# Patient Record
Sex: FEMALE | Race: WHITE | Employment: UNEMPLOYED | URBAN - METROPOLITAN AREA
[De-identification: names, ages, dates, MRNs, and addresses within clinical notes are randomized per-mention and may not be internally consistent; named-entity substitution may affect disease eponyms.]

---

## 2019-06-09 ENCOUNTER — HOSPITAL ENCOUNTER (EMERGENCY)
Age: 54
Discharge: HOME | End: 2019-06-09
Attending: EMERGENCY MEDICINE
Payer: COMMERCIAL

## 2019-06-09 ENCOUNTER — APPOINTMENT (OUTPATIENT)
Dept: CT IMAGING | Age: 54
End: 2019-06-09
Payer: COMMERCIAL

## 2019-06-09 VITALS
OXYGEN SATURATION: 97 % | TEMPERATURE: 98.6 F | DIASTOLIC BLOOD PRESSURE: 74 MMHG | HEART RATE: 81 BPM | SYSTOLIC BLOOD PRESSURE: 114 MMHG | RESPIRATION RATE: 16 BRPM

## 2019-06-09 DIAGNOSIS — R51.9 NONINTRACTABLE HEADACHE, UNSPECIFIED CHRONICITY PATTERN, UNSPECIFIED HEADACHE TYPE: Primary | ICD-10-CM

## 2019-06-09 LAB
ALBUMIN SERPL-MCNC: 4 G/DL (ref 3.5–5.2)
ALP BLD-CCNC: 77 U/L (ref 35–104)
ALT SERPL-CCNC: 11 U/L (ref 0–32)
ANION GAP SERPL CALCULATED.3IONS-SCNC: 10 MMOL/L (ref 7–16)
AST SERPL-CCNC: 17 U/L (ref 0–31)
BASOPHILS ABSOLUTE: 0.06 E9/L (ref 0–0.2)
BASOPHILS RELATIVE PERCENT: 0.7 % (ref 0–2)
BILIRUB SERPL-MCNC: 0.2 MG/DL (ref 0–1.2)
BUN BLDV-MCNC: 16 MG/DL (ref 6–20)
CALCIUM SERPL-MCNC: 9.2 MG/DL (ref 8.6–10.2)
CHLORIDE BLD-SCNC: 105 MMOL/L (ref 98–107)
CO2: 28 MMOL/L (ref 22–29)
CREAT SERPL-MCNC: 0.9 MG/DL (ref 0.5–1)
EOSINOPHILS ABSOLUTE: 0.09 E9/L (ref 0.05–0.5)
EOSINOPHILS RELATIVE PERCENT: 1 % (ref 0–6)
GFR AFRICAN AMERICAN: >60
GFR NON-AFRICAN AMERICAN: >60 ML/MIN/1.73
GLUCOSE BLD-MCNC: 96 MG/DL (ref 74–99)
HCT VFR BLD CALC: 40.4 % (ref 34–48)
HEMOGLOBIN: 13.2 G/DL (ref 11.5–15.5)
IMMATURE GRANULOCYTES #: 0.06 E9/L
IMMATURE GRANULOCYTES %: 0.7 % (ref 0–5)
LACTIC ACID: 1.1 MMOL/L (ref 0.5–2.2)
LYMPHOCYTES ABSOLUTE: 2.45 E9/L (ref 1.5–4)
LYMPHOCYTES RELATIVE PERCENT: 27 % (ref 20–42)
MCH RBC QN AUTO: 28.6 PG (ref 26–35)
MCHC RBC AUTO-ENTMCNC: 32.7 % (ref 32–34.5)
MCV RBC AUTO: 87.6 FL (ref 80–99.9)
MONOCYTES ABSOLUTE: 0.73 E9/L (ref 0.1–0.95)
MONOCYTES RELATIVE PERCENT: 8 % (ref 2–12)
NEUTROPHILS ABSOLUTE: 5.7 E9/L (ref 1.8–7.3)
NEUTROPHILS RELATIVE PERCENT: 62.6 % (ref 43–80)
PDW BLD-RTO: 13.5 FL (ref 11.5–15)
PLATELET # BLD: 204 E9/L (ref 130–450)
PMV BLD AUTO: 12.3 FL (ref 7–12)
POTASSIUM REFLEX MAGNESIUM: 3.7 MMOL/L (ref 3.5–5)
RBC # BLD: 4.61 E12/L (ref 3.5–5.5)
SODIUM BLD-SCNC: 143 MMOL/L (ref 132–146)
TOTAL PROTEIN: 7 G/DL (ref 6.4–8.3)
WBC # BLD: 9.1 E9/L (ref 4.5–11.5)

## 2019-06-09 PROCEDURE — 96375 TX/PRO/DX INJ NEW DRUG ADDON: CPT

## 2019-06-09 PROCEDURE — 2580000003 HC RX 258: Performed by: NURSE PRACTITIONER

## 2019-06-09 PROCEDURE — 70498 CT ANGIOGRAPHY NECK: CPT

## 2019-06-09 PROCEDURE — 6360000002 HC RX W HCPCS: Performed by: NURSE PRACTITIONER

## 2019-06-09 PROCEDURE — 96374 THER/PROPH/DIAG INJ IV PUSH: CPT

## 2019-06-09 PROCEDURE — 70496 CT ANGIOGRAPHY HEAD: CPT

## 2019-06-09 PROCEDURE — 99284 EMERGENCY DEPT VISIT MOD MDM: CPT

## 2019-06-09 PROCEDURE — 85025 COMPLETE CBC W/AUTO DIFF WBC: CPT

## 2019-06-09 PROCEDURE — 83605 ASSAY OF LACTIC ACID: CPT

## 2019-06-09 PROCEDURE — 96376 TX/PRO/DX INJ SAME DRUG ADON: CPT

## 2019-06-09 PROCEDURE — 36415 COLL VENOUS BLD VENIPUNCTURE: CPT

## 2019-06-09 PROCEDURE — 80053 COMPREHEN METABOLIC PANEL: CPT

## 2019-06-09 PROCEDURE — 6360000002 HC RX W HCPCS

## 2019-06-09 PROCEDURE — 93005 ELECTROCARDIOGRAM TRACING: CPT | Performed by: EMERGENCY MEDICINE

## 2019-06-09 PROCEDURE — 6360000004 HC RX CONTRAST MEDICATION: Performed by: RADIOLOGY

## 2019-06-09 PROCEDURE — 70450 CT HEAD/BRAIN W/O DYE: CPT

## 2019-06-09 RX ORDER — 0.9 % SODIUM CHLORIDE 0.9 %
1000 INTRAVENOUS SOLUTION INTRAVENOUS ONCE
Status: COMPLETED | OUTPATIENT
Start: 2019-06-09 | End: 2019-06-09

## 2019-06-09 RX ORDER — METOCLOPRAMIDE HYDROCHLORIDE 5 MG/ML
10 INJECTION INTRAMUSCULAR; INTRAVENOUS ONCE
Status: COMPLETED | OUTPATIENT
Start: 2019-06-09 | End: 2019-06-09

## 2019-06-09 RX ORDER — DIPHENHYDRAMINE HYDROCHLORIDE 50 MG/ML
25 INJECTION INTRAMUSCULAR; INTRAVENOUS ONCE
Status: COMPLETED | OUTPATIENT
Start: 2019-06-09 | End: 2019-06-09

## 2019-06-09 RX ORDER — KETOROLAC TROMETHAMINE 30 MG/ML
15 INJECTION, SOLUTION INTRAMUSCULAR; INTRAVENOUS ONCE
Status: COMPLETED | OUTPATIENT
Start: 2019-06-09 | End: 2019-06-09

## 2019-06-09 RX ORDER — MORPHINE SULFATE 4 MG/ML
4 INJECTION, SOLUTION INTRAMUSCULAR; INTRAVENOUS ONCE
Status: COMPLETED | OUTPATIENT
Start: 2019-06-09 | End: 2019-06-09

## 2019-06-09 RX ORDER — KETOROLAC TROMETHAMINE 30 MG/ML
INJECTION, SOLUTION INTRAMUSCULAR; INTRAVENOUS
Status: COMPLETED
Start: 2019-06-09 | End: 2019-06-09

## 2019-06-09 RX ADMIN — MORPHINE SULFATE 4 MG: 4 INJECTION INTRAVENOUS at 18:51

## 2019-06-09 RX ADMIN — SODIUM CHLORIDE 1000 ML: 9 INJECTION, SOLUTION INTRAVENOUS at 17:52

## 2019-06-09 RX ADMIN — KETOROLAC TROMETHAMINE 15 MG: 30 INJECTION, SOLUTION INTRAMUSCULAR at 17:54

## 2019-06-09 RX ADMIN — KETOROLAC TROMETHAMINE 15 MG: 30 INJECTION, SOLUTION INTRAMUSCULAR; INTRAVENOUS at 21:15

## 2019-06-09 RX ADMIN — IOPAMIDOL 70 ML: 755 INJECTION, SOLUTION INTRAVENOUS at 20:24

## 2019-06-09 RX ADMIN — DIPHENHYDRAMINE HYDROCHLORIDE 25 MG: 50 INJECTION, SOLUTION INTRAMUSCULAR; INTRAVENOUS at 17:52

## 2019-06-09 RX ADMIN — METOCLOPRAMIDE 10 MG: 5 INJECTION, SOLUTION INTRAMUSCULAR; INTRAVENOUS at 17:53

## 2019-06-09 ASSESSMENT — PAIN DESCRIPTION - LOCATION: LOCATION: HEAD

## 2019-06-09 ASSESSMENT — PAIN SCALES - GENERAL
PAINLEVEL_OUTOF10: 10
PAINLEVEL_OUTOF10: 10
PAINLEVEL_OUTOF10: 8
PAINLEVEL_OUTOF10: 4

## 2019-06-09 ASSESSMENT — PAIN DESCRIPTION - FREQUENCY: FREQUENCY: CONTINUOUS

## 2019-06-09 ASSESSMENT — PAIN DESCRIPTION - DESCRIPTORS: DESCRIPTORS: OTHER (COMMENT)

## 2019-06-09 ASSESSMENT — PAIN DESCRIPTION - ORIENTATION: ORIENTATION: ANTERIOR

## 2019-06-09 ASSESSMENT — PAIN DESCRIPTION - PAIN TYPE: TYPE: ACUTE PAIN

## 2019-06-09 NOTE — ED PROVIDER NOTES
ED Attending  CC: Nicole         Department of Emergency Medicine   ED  Provider Note  Admit Date/RoomTime: 6/9/2019  5:17 PM  ED Room: /   Chief Complaint:   Loss of Consciousness (Witnesses state she has passed out multiple times today) and Headache (Migraine headache since thursday)    History of Present Illness   Source of history provided by:  patient. History/Exam Limitations: none. Tristan Bacon is a 48 y.o. old female who has a past medical hx of:   Past Medical History:   Diagnosis Date    Headache(784.0)     migraine    presents to the emergency department by ambulance for complaint of headache for the past 4 days. She states that she has a history of migraines and takes Maxalt. She states that this headache feels different than her normal migraine headaches and the Maxalt is not helping at all. Reports photosensitivity denies or vomiting. Putting to family today she passed out 4 times. Episode she was sitting up felt lightheaded and passed out without falling out of the chair. Each episode was witnessed and denies head injury. He that the pain is on the top of her head and feels like \"my head is going to explode. \" Denies chest pain, shortness of breath, neck pain, fever, chills, nausea, vomiting, diplopia, visual changes, numbness, tingling, or difficulty speaking. Since onset the symptoms have been no change and moderate to severe in severity. The patient has history of migraine headaches diagnosed in the past.  Today's episode is not typical for her. She has had PCP evaluation in the past. There has been no history of recent trauma. Treatment prior to arrival consisted of: Maxalt with minimal relief of symptoms. She has a history of no anticoagulation use. ROS    Pertinent positives and negatives are stated within HPI, all other systems reviewed and are negative.     Past Surgical History:   Procedure Laterality Date    APPENDECTOMY      HIP CLOSED REDUCTION  [id number]    RIGHT WITH involving proximal or distal cervical   internal carotid arteries or vertebral arteries. 4. Congenital hypoplasia of basilar artery. CT Head WO Contrast   Final Result   No indication for an acute intracranial process. ED Course / Medical Decision Making     Medications   0.9 % sodium chloride bolus (0 mLs Intravenous Stopped 6/9/19 2122)   diphenhydrAMINE (BENADRYL) injection 25 mg (25 mg Intravenous Given 6/9/19 1752)   metoclopramide (REGLAN) injection 10 mg (10 mg Intravenous Given 6/9/19 1753)   ketorolac (TORADOL) injection 15 mg (15 mg Intravenous Given 6/9/19 1754)   morphine sulfate (PF) injection 4 mg (4 mg Intravenous Given 6/9/19 1851)   iopamidol (ISOVUE-370) 76 % injection 70 mL (70 mLs Intravenous Given 6/9/19 2024)   ketorolac (TORADOL) injection 15 mg (15 mg Intravenous Given 6/9/19 2115)        Re-examination:  6/9/19       Time: 9068 pt alert and reports easing of headache \"5\"/10   1910 Dr Marcelino Rey at bedside    Consult(s):   None    Procedure(s):   none    MDM:   Geoffrey Templeton is a 61-year-old female who complains of headache for the past 4 days. Reports having a history of migraines and uses Maxalt with no relief of her headache. Today she reports having 3-4 syncopal episodes while sitting up in a chair. They were witnessed and per family she slumped over never fell out of the chair and never struck her head. CT of the head without contrast negative for any acute findings. CBC shows no leukocytosis stable H&H of 13.2/40.4, CMP within normal limits, it acid 1.1 CTA of head and neck no acute findings. IV hydration given medicated with Benadryl, Reglan, Toradol and morphine with significant improvement in her headache. Neurologically she remained intact, no meningeal signs, and non-toxic appearing. Results reviewed with patient and family and plan for discharge. Verbalized understanding and agrees with plan. Instructed to follow up with PCP.  Advised to return for any new,

## 2019-06-10 LAB
EKG ATRIAL RATE: 79 BPM
EKG P AXIS: 77 DEGREES
EKG P-R INTERVAL: 168 MS
EKG Q-T INTERVAL: 400 MS
EKG QRS DURATION: 96 MS
EKG QTC CALCULATION (BAZETT): 458 MS
EKG R AXIS: 57 DEGREES
EKG T AXIS: 75 DEGREES
EKG VENTRICULAR RATE: 79 BPM

## 2019-06-10 PROCEDURE — 93010 ELECTROCARDIOGRAM REPORT: CPT | Performed by: INTERNAL MEDICINE

## 2021-05-22 ENCOUNTER — APPOINTMENT (OUTPATIENT)
Dept: CT IMAGING | Age: 56
DRG: 045 | End: 2021-05-22
Payer: COMMERCIAL

## 2021-05-22 ENCOUNTER — HOSPITAL ENCOUNTER (INPATIENT)
Age: 56
LOS: 3 days | Discharge: HOME | DRG: 045 | End: 2021-05-25
Attending: EMERGENCY MEDICINE | Admitting: FAMILY MEDICINE
Payer: COMMERCIAL

## 2021-05-22 ENCOUNTER — APPOINTMENT (OUTPATIENT)
Dept: GENERAL RADIOLOGY | Age: 56
DRG: 045 | End: 2021-05-22
Payer: COMMERCIAL

## 2021-05-22 DIAGNOSIS — I63.9 CEREBROVASCULAR ACCIDENT (CVA), UNSPECIFIED MECHANISM (HCC): Primary | ICD-10-CM

## 2021-05-22 LAB
ALBUMIN SERPL-MCNC: 4.4 G/DL (ref 3.5–5.2)
ALP BLD-CCNC: 87 U/L (ref 35–104)
ALT SERPL-CCNC: 16 U/L (ref 0–32)
ANION GAP SERPL CALCULATED.3IONS-SCNC: 14 MMOL/L (ref 7–16)
APTT: 32.8 SEC (ref 24.5–35.1)
AST SERPL-CCNC: 20 U/L (ref 0–31)
BASOPHILS ABSOLUTE: 0.05 E9/L (ref 0–0.2)
BASOPHILS RELATIVE PERCENT: 0.6 % (ref 0–2)
BILIRUB SERPL-MCNC: 0.6 MG/DL (ref 0–1.2)
BUN BLDV-MCNC: 20 MG/DL (ref 6–20)
CALCIUM SERPL-MCNC: 9.6 MG/DL (ref 8.6–10.2)
CHLORIDE BLD-SCNC: 103 MMOL/L (ref 98–107)
CHP ED QC CHECK: YES
CO2: 23 MMOL/L (ref 22–29)
CREAT SERPL-MCNC: 0.9 MG/DL (ref 0.5–1)
EOSINOPHILS ABSOLUTE: 0.07 E9/L (ref 0.05–0.5)
EOSINOPHILS RELATIVE PERCENT: 0.8 % (ref 0–6)
GFR AFRICAN AMERICAN: >60
GFR NON-AFRICAN AMERICAN: >60 ML/MIN/1.73
GLUCOSE BLD-MCNC: 80 MG/DL (ref 74–99)
GLUCOSE BLD-MCNC: 81 MG/DL
HCT VFR BLD CALC: 40.9 % (ref 34–48)
HEMOGLOBIN: 13.2 G/DL (ref 11.5–15.5)
IMMATURE GRANULOCYTES #: 0.03 E9/L
IMMATURE GRANULOCYTES %: 0.3 % (ref 0–5)
INR BLD: 1
LYMPHOCYTES ABSOLUTE: 1.87 E9/L (ref 1.5–4)
LYMPHOCYTES RELATIVE PERCENT: 21.8 % (ref 20–42)
MCH RBC QN AUTO: 28.2 PG (ref 26–35)
MCHC RBC AUTO-ENTMCNC: 32.3 % (ref 32–34.5)
MCV RBC AUTO: 87.4 FL (ref 80–99.9)
METER GLUCOSE: 81 MG/DL (ref 74–99)
MONOCYTES ABSOLUTE: 0.62 E9/L (ref 0.1–0.95)
MONOCYTES RELATIVE PERCENT: 7.2 % (ref 2–12)
NEUTROPHILS ABSOLUTE: 5.95 E9/L (ref 1.8–7.3)
NEUTROPHILS RELATIVE PERCENT: 69.3 % (ref 43–80)
PDW BLD-RTO: 13.3 FL (ref 11.5–15)
PLATELET # BLD: 242 E9/L (ref 130–450)
PMV BLD AUTO: 11.8 FL (ref 7–12)
POTASSIUM SERPL-SCNC: 3.6 MMOL/L (ref 3.5–5)
PROTHROMBIN TIME: 11.2 SEC (ref 9.3–12.4)
RBC # BLD: 4.68 E12/L (ref 3.5–5.5)
SARS-COV-2, NAAT: NOT DETECTED
SODIUM BLD-SCNC: 140 MMOL/L (ref 132–146)
TOTAL PROTEIN: 7.6 G/DL (ref 6.4–8.3)
TROPONIN: <0.01 NG/ML (ref 0–0.03)
WBC # BLD: 8.6 E9/L (ref 4.5–11.5)

## 2021-05-22 PROCEDURE — 99284 EMERGENCY DEPT VISIT MOD MDM: CPT

## 2021-05-22 PROCEDURE — 6360000002 HC RX W HCPCS: Performed by: EMERGENCY MEDICINE

## 2021-05-22 PROCEDURE — 70498 CT ANGIOGRAPHY NECK: CPT

## 2021-05-22 PROCEDURE — 99449 NTRPROF PH1/NTRNET/EHR 31/>: CPT | Performed by: PSYCHIATRY & NEUROLOGY

## 2021-05-22 PROCEDURE — 70450 CT HEAD/BRAIN W/O DYE: CPT

## 2021-05-22 PROCEDURE — 71045 X-RAY EXAM CHEST 1 VIEW: CPT

## 2021-05-22 PROCEDURE — 85025 COMPLETE CBC W/AUTO DIFF WBC: CPT

## 2021-05-22 PROCEDURE — 6360000004 HC RX CONTRAST MEDICATION: Performed by: RADIOLOGY

## 2021-05-22 PROCEDURE — 70496 CT ANGIOGRAPHY HEAD: CPT

## 2021-05-22 PROCEDURE — 82962 GLUCOSE BLOOD TEST: CPT

## 2021-05-22 PROCEDURE — 6370000000 HC RX 637 (ALT 250 FOR IP)

## 2021-05-22 PROCEDURE — 2000000000 HC ICU R&B

## 2021-05-22 PROCEDURE — 36415 COLL VENOUS BLD VENIPUNCTURE: CPT

## 2021-05-22 PROCEDURE — 2580000003 HC RX 258

## 2021-05-22 PROCEDURE — 85610 PROTHROMBIN TIME: CPT

## 2021-05-22 PROCEDURE — 80053 COMPREHEN METABOLIC PANEL: CPT

## 2021-05-22 PROCEDURE — 96374 THER/PROPH/DIAG INJ IV PUSH: CPT

## 2021-05-22 PROCEDURE — 2580000003 HC RX 258: Performed by: EMERGENCY MEDICINE

## 2021-05-22 PROCEDURE — 85730 THROMBOPLASTIN TIME PARTIAL: CPT

## 2021-05-22 PROCEDURE — 6360000002 HC RX W HCPCS

## 2021-05-22 PROCEDURE — 0042T CT BRAIN PERFUSION: CPT | Performed by: RADIOLOGY

## 2021-05-22 PROCEDURE — 3E03317 INTRODUCTION OF OTHER THROMBOLYTIC INTO PERIPHERAL VEIN, PERCUTANEOUS APPROACH: ICD-10-PCS | Performed by: INTERNAL MEDICINE

## 2021-05-22 PROCEDURE — 99291 CRITICAL CARE FIRST HOUR: CPT | Performed by: SURGERY

## 2021-05-22 PROCEDURE — 84484 ASSAY OF TROPONIN QUANT: CPT

## 2021-05-22 PROCEDURE — 93005 ELECTROCARDIOGRAM TRACING: CPT | Performed by: EMERGENCY MEDICINE

## 2021-05-22 PROCEDURE — 0042T CT BRAIN PERFUSION: CPT

## 2021-05-22 PROCEDURE — 70498 CT ANGIOGRAPHY NECK: CPT | Performed by: RADIOLOGY

## 2021-05-22 PROCEDURE — 87635 SARS-COV-2 COVID-19 AMP PRB: CPT

## 2021-05-22 PROCEDURE — 70496 CT ANGIOGRAPHY HEAD: CPT | Performed by: RADIOLOGY

## 2021-05-22 RX ORDER — PROMETHAZINE HYDROCHLORIDE 25 MG/1
12.5 TABLET ORAL EVERY 6 HOURS PRN
Status: DISCONTINUED | OUTPATIENT
Start: 2021-05-22 | End: 2021-05-25 | Stop reason: HOSPADM

## 2021-05-22 RX ORDER — ACETAMINOPHEN 500 MG
TABLET ORAL
Status: COMPLETED
Start: 2021-05-22 | End: 2021-05-22

## 2021-05-22 RX ORDER — SODIUM CHLORIDE 0.9 % (FLUSH) 0.9 %
5-40 SYRINGE (ML) INJECTION PRN
Status: DISCONTINUED | OUTPATIENT
Start: 2021-05-22 | End: 2021-05-25 | Stop reason: HOSPADM

## 2021-05-22 RX ORDER — ROSUVASTATIN CALCIUM 20 MG/1
40 TABLET, COATED ORAL NIGHTLY
Status: DISCONTINUED | OUTPATIENT
Start: 2021-05-22 | End: 2021-05-25 | Stop reason: HOSPADM

## 2021-05-22 RX ORDER — SODIUM CHLORIDE 0.9 % (FLUSH) 0.9 %
10 SYRINGE (ML) INJECTION
Status: ACTIVE | OUTPATIENT
Start: 2021-05-22 | End: 2021-05-22

## 2021-05-22 RX ORDER — SODIUM CHLORIDE 0.9 % (FLUSH) 0.9 %
5-40 SYRINGE (ML) INJECTION EVERY 12 HOURS SCHEDULED
Status: DISCONTINUED | OUTPATIENT
Start: 2021-05-22 | End: 2021-05-25 | Stop reason: HOSPADM

## 2021-05-22 RX ORDER — DEXTROSE MONOHYDRATE 25 G/50ML
12.5 INJECTION, SOLUTION INTRAVENOUS
Status: ACTIVE | OUTPATIENT
Start: 2021-05-22 | End: 2021-05-22

## 2021-05-22 RX ORDER — ONDANSETRON 2 MG/ML
4 INJECTION INTRAMUSCULAR; INTRAVENOUS EVERY 6 HOURS PRN
Status: DISCONTINUED | OUTPATIENT
Start: 2021-05-22 | End: 2021-05-25 | Stop reason: HOSPADM

## 2021-05-22 RX ORDER — 0.9 % SODIUM CHLORIDE 0.9 %
50 INTRAVENOUS SOLUTION INTRAVENOUS ONCE
Status: COMPLETED | OUTPATIENT
Start: 2021-05-22 | End: 2021-05-22

## 2021-05-22 RX ORDER — SODIUM CHLORIDE 9 MG/ML
25 INJECTION, SOLUTION INTRAVENOUS PRN
Status: DISCONTINUED | OUTPATIENT
Start: 2021-05-22 | End: 2021-05-25 | Stop reason: HOSPADM

## 2021-05-22 RX ADMIN — ALTEPLASE 7.8 MG: KIT at 15:49

## 2021-05-22 RX ADMIN — SODIUM CHLORIDE 50 ML: 9 INJECTION, SOLUTION INTRAVENOUS at 16:45

## 2021-05-22 RX ADMIN — ACETAMINOPHEN 1000 MG: 500 TABLET ORAL at 18:45

## 2021-05-22 RX ADMIN — IOPAMIDOL 100 ML: 755 INJECTION, SOLUTION INTRAVENOUS at 15:35

## 2021-05-22 RX ADMIN — ALTEPLASE 69.8 MG: KIT at 15:50

## 2021-05-22 ASSESSMENT — PAIN SCALES - GENERAL
PAINLEVEL_OUTOF10: 0
PAINLEVEL_OUTOF10: 7
PAINLEVEL_OUTOF10: 0
PAINLEVEL_OUTOF10: 0

## 2021-05-22 NOTE — H&P
Hospitalist History & Physical      PCP: Sudha Zabala MD    Date of Admission: 5/22/2021    Date of Service: Pt seen/examined on 5/22/2021 and is admitted to Inpatient with expected LOS greater than two midnights due to medical therapy. Chief Complaint:  had concerns including Numbness (left arm numbness started while mowing the lawn. ). History Of Present Illness:    Ms. Giana Mcbride is a 50y.o. year old female  who  has a past medical history of Headache(784.0). She presented to the ER on 5/22/21 with complaints of left-sided weakness onset approximately 1-1/2 hours prior to arrival.  Patient states that she was working in the yard this afternoon when her left arm started tingling. She states that within 5 minutes her entire left arm was numb, her left leg was weak and she had burning on the left side of her neck. She states that she eventually felt so \"sick\" that she laid down on the floor. Her daughter called for an ambulance. Her initial NIH stroke scale score was 6. Vital signs were stable on arrival. Labs were unremarkable. Imaging of the head and neck showed mild atherosclerotic disease with no hemodynamically significant left or right internal carotid stenosis. CT brain perfusion showed no ischemic penumbra. Telestroke was consulted TPA was administered at . She is to be admitted to neuro intensive. Past Medical History:        Diagnosis Date    Headache(784.0)     migraine       Past Surgical History:        Procedure Laterality Date    APPENDECTOMY      HIP CLOSED REDUCTION  [id number]    RIGHT WITH PERCUTANEOUS PINNING    HYSTERECTOMY         Medications Prior to Admission:      Prior to Admission medications    Medication Sig Start Date End Date Taking? Authorizing Provider   ibuprofen (ADVIL;MOTRIN) 800 MG tablet Take 1 tablet by mouth every 8 hours as needed for Pain.  4/22/15  Yes Dereje Ebbs, APRN - CNP   Rizatriptan Benzoate (MAXALT PO) Take  by mouth as needed. Yes Historical Provider, MD       Allergies:  Patient has no known allergies. Social History:    RESIDENCE: Private residence  TOBACCO:   reports that she has been smoking. She has a 20.00 pack-year smoking history. She has never used smokeless tobacco.  ETOH:   reports no history of alcohol use. Family History:    As follows:      Problem Relation Age of Onset    High Blood Pressure Mother     Stroke Father     Bleeding Prob Father        REVIEW OF SYSTEMS:   Pertinent positives as noted in the HPI. All other systems reviewed and negative. PHYSICAL EXAM:  /81   Pulse 75   Temp 98 °F (36.7 °C) (Tympanic)   Resp 19   Ht 5' 8\" (1.727 m)   Wt 190 lb (86.2 kg)   SpO2 95%   BMI 28.89 kg/m²   General appearance: No apparent distress, appears stated age and cooperative. HEENT: Normal cephalic, atraumatic without obvious deformity. Pupils equal, round, and reactive to light. Neck: Supple, with full range of motion. No jugular venous distention. Trachea midline. Respiratory:  Clear to auscultation bilaterally. No apparent distress. Cardiovascular:  Regular rate and rhythm. S1, S2 without murmurs, rubs, or gallops. PV: Brisk capillary refill. +2 pedal and radial pulses bilaterally. No clubbing, cyanosis, edema of bilateral lower extremities. Abdomen: Soft, non-tender, non-distended. +BS  Musculoskeletal: No obvious deformities or erythematous or edematous joints. Skin: Normal skin color. No rashes or lesions. Neurologic: Left upper extremity weakness, left lower extremity with drift.   No facial droop, no dysarthria or aphasia noted  Psychiatric: Calm and cooperative    Reviewed EKG and CXR personally      CBC:   Recent Labs     05/22/21  1511   WBC 8.6   RBC 4.68   HGB 13.2   HCT 40.9   MCV 87.4   RDW 13.3        BMP:   Recent Labs     05/22/21  1511      K 3.6      CO2 23   BUN 20   CREATININE 0.9     LFT:  Recent Labs     05/22/21  1511   PROT 7.6 ALKPHOS 87   ALT 16   AST 20   BILITOT 0.6     CE:  Recent Labs     05/22/21  1511   TROPONINI <0.01     PT/INR:   Recent Labs     05/22/21  1505   INR 1.0   APTT 32.8     BNP: No results for input(s): BNP in the last 72 hours. ESR: No results found for: SEDRATE  CRP: No results found for: CRP  D Dimer: No results found for: DDIMER   Folate and B12: No results found for: ZXXPUTMP29, No results found for: FOLATE  Lactic Acid:   Lab Results   Component Value Date    LACTA 1.1 06/09/2019     Thyroid Studies: No results found for: TSH, M4JEKRU, S5GGAJS, THYROIDAB    Oupatient labs:  Lab Results   Component Value Date    INR 1.0 05/22/2021       Urinalysis:  No results found for: NITRU, WBCUA, BACTERIA, RBCUA, BLOODU, SPECGRAV, GLUCOSEU    Imaging:  CT HEAD WO CONTRAST    Result Date: 5/22/2021  No acute intracranial abnormality. Results were called by Dr. Ty Begum to [address] on 5/22/2021 at 15:43. CTA NECK W CONTRAST    Result Date: 5/22/2021  1. Mild atherosclerotic disease . No large vessel occlusion identified 2. Estimated stenosis of the proximal right and left internal carotid artery by NASCET criteria is not hemodynamically significant This study was analyzed by the [employer] algorithm. CT BRAIN PERFUSION    Result Date: 5/22/2021  No significant ischemic penumbra identified This study was analyzed by the [employer] algorithm. CTA HEAD W CONTRAST    Result Date: 5/22/2021  1. Mild atherosclerotic disease . No large vessel occlusion identified 2. Estimated stenosis of the proximal right and left internal carotid artery by NASCET criteria is not hemodynamically significant This study was analyzed by the [employer] ai algorithm. ASSESSMENT:    Acute ischemic CVA-initial NIH stroke scale score 6 on arrival-no movement of the left arm, left lower extremity drift and mild to moderate sensory loss on the left side. Imaging without any evidence of hemorrhage or ischemic penumbra. Given TPA at . Will require workup as the etiology is unclear. Former smoker- 20 pack year smoking hx, quit June 2020    Hx right hip fracture s/p percutaneous pinning (2013)    PLAN:    Per Neuro ICU team        Diet: Diet NPO Effective Now  Code Status: Prior  Surrogate decision maker confirmed with patient:   Extended Emergency Contact Information  Primary Emergency Contact: Hernan Diamond 82 Phone: [phone]  Relation: Child  Secondary Emergency Contact: Ameena Songr  Address: [address] [place] [address]           Liat Virgen [address] [place] [address] Phone: [phone]  Relation: Parent      Disposition: []Med/Surg [] Intermediate [x] ICU/CCU  Admit status: [] Observation [x] Inpatient     +++++++++++++++++++++++++++++++++++++++++++++++++  [address], [place]  +++++++++++++++++++++++++++++++++++++++++++++++++  NOTE: This report was transcribed using voice recognition software. Every effort was made to ensure accuracy; however, inadvertent computerized transcription errors may be present. Patient seen independently of NP, resting on ER stretcher. Reports improved movement of LLE and slight movement of left fingers. Also c/o headache. Reports she has a history of migraines. Many questions from patient and family answered, will continue workup, monitor telemetry.     [address]

## 2021-05-22 NOTE — ED NOTES
Bed: 20  Expected date:   Expected time:   Means of arrival:   Comments:  LF Darrick Opitz, RN  05/22/21 6843

## 2021-05-22 NOTE — ED PROVIDER NOTES
HPI:  5/22/21,   Time: 3:09 PM EDT         Isrrael He is a 50 y.o. female presenting to the ED for sudden onset of numbness and weakness of her left arm, beginning 90 to 120 minutes ago . The complaint has been persistent, moderate in severity, and worsened by nothing. ROS:   Pertinent positives and negatives are stated within HPI, all other systems reviewed and are negative.  --------------------------------------------- PAST HISTORY ---------------------------------------------  Past Medical History:  has a past medical history of Headache(784.0). Past Surgical History:  has a past surgical history that includes Hysterectomy; Appendectomy; and Hip Closed Reduction ([id number]). Social History:  reports that she has been smoking. She has a 20.00 pack-year smoking history. She has never used smokeless tobacco. She reports that she does not drink alcohol and does not use drugs. Family History: family history includes Bleeding Prob in her father; High Blood Pressure in her mother; Stroke in her father. The patients home medications have been reviewed. Allergies: Patient has no known allergies.     -------------------------------------------------- RESULTS -------------------------------------------------  All laboratory and radiology results have been personally reviewed by myself   LABS:  Results for orders placed or performed during the hospital encounter of 05/22/21   CBC Auto Differential   Result Value Ref Range    WBC 8.6 4.5 - 11.5 E9/L    RBC 4.68 3.50 - 5.50 E12/L    Hemoglobin 13.2 11.5 - 15.5 g/dL    Hematocrit 40.9 34.0 - 48.0 %    MCV 87.4 80.0 - 99.9 fL    MCH 28.2 26.0 - 35.0 pg    MCHC 32.3 32.0 - 34.5 %    RDW 13.3 11.5 - 15.0 fL    Platelets 737 077 - 718 E9/L    MPV 11.8 7.0 - 12.0 fL    Neutrophils % 69.3 43.0 - 80.0 %    Immature Granulocytes % 0.3 0.0 - 5.0 %    Lymphocytes % 21.8 20.0 - 42.0 %    Monocytes % 7.2 2.0 - 12.0 %    Eosinophils % 0.8 0.0 - 6.0 %    Basophils % 0.6 0.0 - 2.0 %    Neutrophils Absolute 5.95 1.80 - 7.30 E9/L    Immature Granulocytes # 0.03 E9/L    Lymphocytes Absolute 1.87 1.50 - 4.00 E9/L    Monocytes Absolute 0.62 0.10 - 0.95 E9/L    Eosinophils Absolute 0.07 0.05 - 0.50 E9/L    Basophils Absolute 0.05 0.00 - 0.20 E9/L   Comprehensive Metabolic Panel   Result Value Ref Range    Sodium 140 132 - 146 mmol/L    Potassium 3.6 3.5 - 5.0 mmol/L    Chloride 103 98 - 107 mmol/L    CO2 23 22 - 29 mmol/L    Anion Gap 14 7 - 16 mmol/L    Glucose 80 74 - 99 mg/dL    BUN 20 6 - 20 mg/dL    CREATININE 0.9 0.5 - 1.0 mg/dL    GFR Non-African American >60 >=60 mL/min/1.73    GFR African American >60     Calcium 9.6 8.6 - 10.2 mg/dL    Total Protein 7.6 6.4 - 8.3 g/dL    Albumin 4.4 3.5 - 5.2 g/dL    Total Bilirubin 0.6 0.0 - 1.2 mg/dL    Alkaline Phosphatase 87 35 - 104 U/L    ALT 16 0 - 32 U/L    AST 20 0 - 31 U/L   Troponin   Result Value Ref Range    Troponin <0.01 0.00 - 0.03 ng/mL   Protime-INR   Result Value Ref Range    Protime 11.2 9.3 - 12.4 sec    INR 1.0    APTT   Result Value Ref Range    aPTT 32.8 24.5 - 35.1 sec   POCT Glucose   Result Value Ref Range    Meter Glucose 81 74 - 99 mg/dL   POCT Glucose   Result Value Ref Range    Glucose 81 mg/dL    QC OK? yes    EKG 12 Lead   Result Value Ref Range    Ventricular Rate 83 BPM    Atrial Rate 83 BPM    P-R Interval 176 ms    QRS Duration 96 ms    Q-T Interval 392 ms    QTc Calculation (Bazett) 460 ms    P Axis 57 degrees    R Axis 6 degrees    T Axis 41 degrees       RADIOLOGY:  Interpreted by Radiologist.  CTA HEAD W CONTRAST   Final Result   1. Mild atherosclerotic disease . No large vessel occlusion   identified   2. Estimated stenosis of the proximal right and left internal carotid   artery by NASCET criteria is not hemodynamically significant         This study was analyzed by the [employer] algorithm. CTA NECK W CONTRAST   Final Result   1. Mild atherosclerotic disease .  No large vessel occlusion identified   2. Estimated stenosis of the proximal right and left internal carotid   artery by NASCET criteria is not hemodynamically significant         This study was analyzed by the [employer] algorithm. CT BRAIN PERFUSION   Final Result      No significant ischemic penumbra identified      This study was analyzed by the [employer] algorithm. CT HEAD WO CONTRAST   Final Result   No acute intracranial abnormality. Results were called by Dr. Bird Shane to Jacqueline Montero on 5/22/2021 at   15:43. XR CHEST PORTABLE    (Results Pending)   CT head without contrast    (Results Pending)       ------------------------- NURSING NOTES AND VITALS REVIEWED ---------------------------   The nursing notes within the ED encounter and vital signs as below have been reviewed. /83   Pulse 79   Temp 98 °F (36.7 °C) (Tympanic)   Resp 20   Ht 5' 8\" (1.727 m)   Wt 190 lb (86.2 kg)   SpO2 95%   BMI 28.89 kg/m²   Oxygen Saturation Interpretation: Normal      ---------------------------------------------------PHYSICAL EXAM--------------------------------------      Constitutional/General: Alert and oriented x3, well appearing, non toxic in NAD  Head: NC/AT  Eyes: PERRL, EOMI  Mouth: Oropharynx clear, handling secretions, no trismus  Neck: Supple, full ROM, no meningeal signs  Pulmonary: Lungs clear to auscultation bilaterally, no wheezes, rales, or rhonchi. Not in respiratory distress  Cardiovascular:  Regular rate and rhythm, no murmurs, gallops, or rubs. 2+ distal pulses  Abdomen: Soft, non tender, non distended,   Extremities: Moves all extremities x 4. Warm and well perfused  Skin: warm and dry without rash  Neurologic: GCS 15, see below  Psych: Normal Affect  NIH Stroke Scale at time of initial evaluation:  1A: Level of Consciousness 0 - alert; keenly responsive   1B: Ask Month and Age 0 - answers both questions correctly   1C:  Tell Patient To Open and Close Eyes, then Hand  Squeeze 0 - performs both tasks correctly   2: Test Horizontal Extraocular Movements 0 - normal   3: Test Visual Fields 0 - no visual loss   4: Test Facial Palsy 0 - normal symmetric movement   5A: Test Left Arm Motor Drift 4 - no movement   5B: Test Right Arm Motor Drift 0 - no drift, limb holds 90 (or 45) degrees for full 10 seconds   6A: Test Left Leg Motor Drift 1 - drift; leg falls by the end of the 5 second period but does not hit bed   6B: Test Right Leg Motor Drift 0 - no drift; leg holds 30 degree position for full 5 seconds   7: Test Limb Ataxia   (FNF/Heel-Shin) 0 - absent   8: Test Sensation 1 - mild to moderate sensory loss; patient feels pinprick is less sharp or is dull on the affected side; there is a loss of superficial pain with pinprick but patient is aware of being touched    9: Test Language/Aphasia 0 - no aphasia, normal   10: Test Dysarthria 0 - normal   11: Test Extinction/Inattention 0 - no abnormality   Total 6       Acute CVA Core Measures:      - t-PA Eligibility: IV t-PA was Administered-Given 10% over one minute, then remaining 90% given as infusion over 60 minutes.    - The case has been discussed with Dr. Izell Hamman, they agree this patient is eligible for t-PA.         ------------------------------ ED COURSE/MEDICAL DECISION MAKING----------------------  Medications   sodium chloride flush 0.9 % injection 10 mL (has no administration in time range)   sodium chloride flush 0.9 % injection 5-40 mL (has no administration in time range)   sodium chloride flush 0.9 % injection 5-40 mL (has no administration in time range)   0.9 % sodium chloride infusion (has no administration in time range)   enoxaparin (LOVENOX) injection 40 mg (has no administration in time range)   dextrose 50 % IV solution (has no administration in time range)   alteplase (ACTIVASE) injection 7.8 mg (0 mg Intravenous Stopped 5/22/21 1550)     Followed by   alteplase (ACTIVASE) injection 69.8 mg (69.8 mg Intravenous New Bag 5/22/21 6056)     Followed by   0.9 % sodium chloride bolus (has no administration in time range)   promethazine (PHENERGAN) tablet 12.5 mg (has no administration in time range)     Or   ondansetron (ZOFRAN) injection 4 mg (has no administration in time range)   rosuvastatin (CRESTOR) tablet 40 mg (has no administration in time range)   iopamidol (ISOVUE-370) 76 % injection 100 mL (100 mLs Intravenous Given 5/22/21 6342)     Please note that the withdrawal or failure to initiate urgent interventions for this patient would likely result in a life threatening deterioration or permanent disability. Accordingly this patient received 30 minutes of critical care time, excluding separately billable procedures. Medical Decision Making:    Patient was made a stroke team.  She was discussed with the neurologist who recommended that she be given TPA. She was consented after it was explained to her there was a 6% risk of intracranial hemorrhage with a 3% risk of a likely significant intracranial hemorrhage from the medicine itself. It was also explained to the patient that the benefit of the medicine is a potential 30% increase in function of her arm at 90 days. Patient understood this she discussed it with her family and elected to proceed with thrombolytic therapy. Thrombolytic therapy was administered. Patient was discussed with Dr. Fina Vargas and will be admitted to the ICU she was discussed with [facility] physicians and they agreed to be the admitting service    Counseling: The emergency provider has spoken with the patient and family member patient and daughter and discussed todays results, in addition to providing specific details for the plan of care and counseling regarding the diagnosis and prognosis. Questions are answered at this time and they are agreeable with the plan.      --------------------------------- IMPRESSION AND DISPOSITION ---------------------------------    IMPRESSION  1.  Cerebrovascular accident (CVA), unspecified mechanism ([facility] [address]        DISPOSITION  Disposition: Admit to CCU/ICU  Patient condition is critical                  Russ Denny MD  05/22/21 2038

## 2021-05-23 ENCOUNTER — APPOINTMENT (OUTPATIENT)
Dept: CT IMAGING | Age: 56
DRG: 045 | End: 2021-05-23
Payer: COMMERCIAL

## 2021-05-23 LAB
ANION GAP SERPL CALCULATED.3IONS-SCNC: 11 MMOL/L (ref 7–16)
ANION GAP SERPL CALCULATED.3IONS-SCNC: 12 MMOL/L (ref 7–16)
BUN BLDV-MCNC: 16 MG/DL (ref 6–20)
BUN BLDV-MCNC: 17 MG/DL (ref 6–20)
CALCIUM SERPL-MCNC: 8.9 MG/DL (ref 8.6–10.2)
CALCIUM SERPL-MCNC: 9.2 MG/DL (ref 8.6–10.2)
CHLORIDE BLD-SCNC: 102 MMOL/L (ref 98–107)
CHLORIDE BLD-SCNC: 105 MMOL/L (ref 98–107)
CO2: 23 MMOL/L (ref 22–29)
CO2: 24 MMOL/L (ref 22–29)
CREAT SERPL-MCNC: 0.7 MG/DL (ref 0.5–1)
CREAT SERPL-MCNC: 0.8 MG/DL (ref 0.5–1)
EKG ATRIAL RATE: 83 BPM
EKG P AXIS: 57 DEGREES
EKG P-R INTERVAL: 176 MS
EKG Q-T INTERVAL: 392 MS
EKG QRS DURATION: 96 MS
EKG QTC CALCULATION (BAZETT): 460 MS
EKG R AXIS: 6 DEGREES
EKG T AXIS: 41 DEGREES
EKG VENTRICULAR RATE: 83 BPM
GFR AFRICAN AMERICAN: >60
GFR AFRICAN AMERICAN: >60
GFR NON-AFRICAN AMERICAN: >60 ML/MIN/1.73
GFR NON-AFRICAN AMERICAN: >60 ML/MIN/1.73
GLUCOSE BLD-MCNC: 93 MG/DL (ref 74–99)
GLUCOSE BLD-MCNC: 99 MG/DL (ref 74–99)
HCT VFR BLD CALC: 38.5 % (ref 34–48)
HEMOGLOBIN: 12.5 G/DL (ref 11.5–15.5)
MAGNESIUM: 2.2 MG/DL (ref 1.6–2.6)
MCH RBC QN AUTO: 28.9 PG (ref 26–35)
MCHC RBC AUTO-ENTMCNC: 32.5 % (ref 32–34.5)
MCV RBC AUTO: 89.1 FL (ref 80–99.9)
PDW BLD-RTO: 13.4 FL (ref 11.5–15)
PHOSPHORUS: 4.3 MG/DL (ref 2.5–4.5)
PLATELET # BLD: 216 E9/L (ref 130–450)
PMV BLD AUTO: 12.2 FL (ref 7–12)
POTASSIUM SERPL-SCNC: 3.8 MMOL/L (ref 3.5–5)
POTASSIUM SERPL-SCNC: 4 MMOL/L (ref 3.5–5)
RBC # BLD: 4.32 E12/L (ref 3.5–5.5)
SODIUM BLD-SCNC: 137 MMOL/L (ref 132–146)
SODIUM BLD-SCNC: 140 MMOL/L (ref 132–146)
WBC # BLD: 5.5 E9/L (ref 4.5–11.5)

## 2021-05-23 PROCEDURE — 6370000000 HC RX 637 (ALT 250 FOR IP): Performed by: SURGERY

## 2021-05-23 PROCEDURE — 6370000000 HC RX 637 (ALT 250 FOR IP): Performed by: EMERGENCY MEDICINE

## 2021-05-23 PROCEDURE — 83735 ASSAY OF MAGNESIUM: CPT

## 2021-05-23 PROCEDURE — 2140000000 HC CCU INTERMEDIATE R&B

## 2021-05-23 PROCEDURE — 93010 ELECTROCARDIOGRAM REPORT: CPT | Performed by: INTERNAL MEDICINE

## 2021-05-23 PROCEDURE — 97161 PT EVAL LOW COMPLEX 20 MIN: CPT

## 2021-05-23 PROCEDURE — 84100 ASSAY OF PHOSPHORUS: CPT

## 2021-05-23 PROCEDURE — 36415 COLL VENOUS BLD VENIPUNCTURE: CPT

## 2021-05-23 PROCEDURE — 85027 COMPLETE CBC AUTOMATED: CPT

## 2021-05-23 PROCEDURE — 99253 IP/OBS CNSLTJ NEW/EST LOW 45: CPT | Performed by: PSYCHIATRY & NEUROLOGY

## 2021-05-23 PROCEDURE — 80048 BASIC METABOLIC PNL TOTAL CA: CPT

## 2021-05-23 PROCEDURE — 97530 THERAPEUTIC ACTIVITIES: CPT

## 2021-05-23 PROCEDURE — 70450 CT HEAD/BRAIN W/O DYE: CPT

## 2021-05-23 PROCEDURE — 2580000003 HC RX 258: Performed by: EMERGENCY MEDICINE

## 2021-05-23 RX ORDER — ACETAMINOPHEN 325 MG/1
650 TABLET ORAL EVERY 4 HOURS PRN
Status: DISCONTINUED | OUTPATIENT
Start: 2021-05-23 | End: 2021-05-25 | Stop reason: HOSPADM

## 2021-05-23 RX ORDER — OXYCODONE HYDROCHLORIDE 5 MG/1
5 TABLET ORAL EVERY 4 HOURS PRN
Status: DISCONTINUED | OUTPATIENT
Start: 2021-05-23 | End: 2021-05-25 | Stop reason: HOSPADM

## 2021-05-23 RX ADMIN — OXYCODONE 5 MG: 5 TABLET ORAL at 12:35

## 2021-05-23 RX ADMIN — ROSUVASTATIN 40 MG: 20 TABLET, FILM COATED ORAL at 01:24

## 2021-05-23 RX ADMIN — ROSUVASTATIN 40 MG: 20 TABLET, FILM COATED ORAL at 23:00

## 2021-05-23 RX ADMIN — ACETAMINOPHEN 650 MG: 325 TABLET ORAL at 01:30

## 2021-05-23 RX ADMIN — Medication 10 ML: at 00:00

## 2021-05-23 RX ADMIN — Medication 10 ML: at 09:13

## 2021-05-23 RX ADMIN — OXYCODONE 5 MG: 5 TABLET ORAL at 06:14

## 2021-05-23 RX ADMIN — Medication 5 ML: at 21:00

## 2021-05-23 ASSESSMENT — PAIN SCALES - GENERAL
PAINLEVEL_OUTOF10: 0
PAINLEVEL_OUTOF10: 8
PAINLEVEL_OUTOF10: 0

## 2021-05-23 ASSESSMENT — PAIN DESCRIPTION - DESCRIPTORS: DESCRIPTORS: HEADACHE

## 2021-05-23 ASSESSMENT — VISUAL ACUITY: VA_NORMAL: 1

## 2021-05-23 NOTE — CONSULTS
and atraumatic. Mouth/Throat:      Mouth: Mucous membranes are moist.   Eyes:      General: Lids are normal.      Extraocular Movements: Extraocular movements intact. Conjunctiva/sclera: Conjunctivae normal.      Pupils: Pupils are equal, round, and reactive to light. Cardiovascular:      Rate and Rhythm: Regular rhythm. Abdominal:      Palpations: Abdomen is soft. Musculoskeletal:      Cervical back: Neck supple. Skin:     General: Skin is warm and dry. Neurological:      Mental Status: She is alert. Deep Tendon Reflexes: Reflexes are normal and symmetric. Psychiatric:         Mood and Affect: Mood normal.         Speech: Speech normal.         Behavior: Behavior normal.                 Neurological Exam  Mental Status  Alert. Oriented to person, place, time and situation. Speech is normal. Language is fluent with no aphasia. Attention and concentration are normal.    Cranial Nerves  CN II: Visual acuity is normal. Visual fields full to confrontation. CN III, IV, VI: Extraocular movements intact bilaterally. Normal lids and orbits bilaterally. Pupils equal round and reactive to light bilaterally. CN V:  Left: Diminished sensation of the entire left side of the face. CN VII: Full and symmetric facial movement. CN VIII: Hearing is normal.  CN IX, X: Palate elevates symmetrically. Normal gag reflex. CN XI: Shoulder shrug strength is normal.  CN XII: Tongue midline without atrophy or fasciculations. Motor  Normal muscle bulk throughout. Normal muscle tone. No abnormal involuntary movements. Strength is 5/5 in all four extremities except as noted. Downward  drift without pronation of the left arm. Initially she gave no movement and no effort. However later in the exam with distraction she was able to hold her arm antigravity. Marked fluctuations in effort give way weakness. .    Sensory  Left hemisensory loss.      Reflexes  Deep tendon reflexes are 2+ and symmetric in all four

## 2021-05-23 NOTE — CONSULTS
Surgical Intensive Care Unit  Consult  Note  Date of admission:  5/22/2021  Reason for ICU transfer:  Critical care monitoring    HPI:   Patient is a 50year old female with PMH of migraines who presented to the ED via EMS with complaint of sudden onset of LUE weakness and numbness. Patient stating she was gardening this morning from about 0700hrs, and at 1300hrs felt onset of symptoms. Patient presented to the ED within 3 hours of symptoms onset. She denies trauma. Is not on AC. She endorses a migraine 2 days ago treated with Maxalt. In the ED, patient was found to have NIHSS = 6 with visual field defects, LUE weakness and numbness and LLE weakness and numbness. Stroke team consult following imaging with decision for tPA, given at 1500hrs. Denies any CP, SOB, N/V, fever, chills, urinary symptoms, GI symptoms. Patient stopped smoking 1 year ago, 40 pack year history of smoking. Physical Exam:  /74   Pulse 75   Temp 98 °F (36.7 °C) (Tympanic)   Resp 20   Ht 5' 8\" (1.727 m)   Wt 190 lb (86.2 kg)   SpO2 99%   BMI 28.89 kg/m²   Physical Exam  Constitutional:       General: She is awake. Appearance: Normal appearance. She is not ill-appearing or toxic-appearing. HENT:      Head: Normocephalic and atraumatic. Mouth/Throat:      Mouth: Mucous membranes are moist.      Pharynx: Oropharynx is clear. Eyes:      General: Visual field deficit present. Extraocular Movements: Extraocular movements intact. Pupils: Pupils are equal, round, and reactive to light. Visual Fields:      Left eye: DL in the upper temporal quadrant. DL in the lower temporal quadrant. Cardiovascular:      Rate and Rhythm: Normal rate and regular rhythm. Pulses: Normal pulses. Radial pulses are 2+ on the right side and 2+ on the left side. Dorsalis pedis pulses are 2+ on the right side and 2+ on the left side. Heart sounds: Normal heart sounds.    Pulmonary:      Effort: Pulmonary effort is normal.      Breath sounds: Normal breath sounds. Abdominal:      General: Abdomen is flat. There is no distension. Musculoskeletal:      Cervical back: Normal range of motion and neck supple. Right lower leg: No edema. Left lower leg: No edema. Skin:     General: Skin is warm and dry. Capillary Refill: Capillary refill takes less than 2 seconds. Neurological:      Mental Status: She is alert and oriented to person, place, and time. GCS: GCS eye subscore is 4. GCS verbal subscore is 5. GCS motor subscore is 6. Cranial Nerves: Cranial nerve deficit present. Sensory: Sensory deficit present. Motor: Weakness present. Comments: Strength RUE and RLE 5/5 without deficits. Strength LUE 1/5 with slight movement of fingers. 0/5 to LUE forearm  Decreased sensation to LUE and LLE. Psychiatric:         Mood and Affect: Mood normal.         Speech: Speech normal.         Behavior: Behavior is cooperative. NIH Stroke Scale/Score at time of initial evaluation:  1A: Level of Consciousness 0 - alert; keenly responsive   1B: Ask Month and Age 0 - answers both questions correctly   1C:  Tell Patient To Open and Close Eyes, then Hand  Squeeze 1 - performs one task correctly   2: Test Horizontal Extraocular Movements 0 - normal   3: Test Visual Fields 1 - partial hemianopia   4: Test Facial Palsy 0 - normal symmetric movement   5A: Test Left Arm Motor Drift 3 - no effort against gravity, limb falls   5B: Test Right Arm Motor Drift 0 - no drift, limb holds 90 (or 45) degrees for full 10 seconds   6A: Test Left Leg Motor Drift 0 - no drift; leg holds 30 degree position for full 5 seconds   6B: Test Right Leg Motor Drift 0 - no drift; leg holds 30 degree position for full 5 seconds   7: Test Limb Ataxia   (FNF/Heel-Shin) 0 - absent   8: Test Sensation 1 - mild to moderate sensory loss; patient feels pinprick is less sharp or is dull on the affected side; there is a loss of superficial pain with pinprick but patient is aware of being touched    9: Test Language/Aphasia 0 - no aphasia, normal   10: Test Dysarthria 0 - normal   11: Test Extinction/Inattention 0 - no abnormality   Total 6     ASSESSMENT / PLAN:  · Neuro:  NIHSS 6 in the NICU. Monitor neuro status. CT head in 24 hours post tPA. Neurology consult. · CV: Start statin. Monitor hemodynamics. SBP goal < 140. · Pulm: No issues. Monitor RR, SpO2   · GI:  No issues. Passed bedside swallow in ED. SLP eval. Diet. Antiemetics. · Renal: No issues. Monitor UOP  · Heme:       No issues. No AC for 24 hours post tPA  · ID:  No issues  · Endo: Monitor glucose. · MSK: No issues. Bowel regimen: Monitor, add regimen as needed. DVT proph:  SCDs, Start lovenox post repeat head CT if no bleed. GI proph:    Glucose protocol:   Mouth/eye care: Peridex/Aqua tears   Sanches:    keep in place for critical care monitoring of fluid balance. CVC sites:  None  Ancillary consults: IM, Neurology, OT/PT  Family Update: As available     Hospital course:  5/22/21  - Admitted. tPA     Tiffanie Blanco MD, PGY 2    Discussed with Dr. Modesta Ford.

## 2021-05-23 NOTE — PROGRESS NOTES
Physical Therapy  Physical Therapy Initial Assessment     Name: Claudia Hernandez  : 1965  MRN: [id number]      Date of Service: 2021    Evaluating PT:  Papo De La Cruz PT, DPT [id number]    Room #:  4498/1153-T  Diagnosis:  Acute cerebrovascular accident (CVA) [facility]) [I63.9]  PMHx/PSHx:  NA  Procedure/Surgery:   tPA  Precautions:  Falls, LUE weakness  Equipment Needs:  None    SUBJECTIVE:    Pt lives with daughter in a 1 story home with 2 stairs to enter and no rail. Full flight of steps and 1 rail to basement. Pt ambulated without device and was independent PTA. OBJECTIVE:   Initial Evaluation  Date: 21 Treatment Short Term/ Long Term   Goals   AM-PAC 6 Clicks      Was pt agreeable to Eval/treatment? Yes     Does pt have pain?  No c/o pain     Bed Mobility  Rolling: NT  Supine to sit: SBA with HOB elevated  Sit to supine: NT  Scooting: SBA  Mod Independent   Transfers Sit to stand: SBA  Stand to sit: SBA  Stand pivot: SBA no device  Independent   Ambulation   300 feet with SBA no device  >400 feet Independently   Stair negotiation: ascended and descended NT  >10 steps with 1 rail Mod Independent   ROM BUE:  WFL  BLE:  WFL     Strength BUE:  LUE weakness  BLE:  4+/5  Increase by 1/3 MMT grade   Balance Sitting EOB:  SBA  Dynamic Standing:  SBA no device  Sitting EOB:  Independent  Dynamic Standing:  Independent     Pt is A & O x 4  CAM-ICU: NT  RASS: 0  Sensation:  LUE paresthesias   Edema:  Unremarkable    Vitals:  Heart Rate at rest 72 bpm Heart Rate post session 76 bpm   SpO2 at rest 97% SpO2 post session 96%   Blood Pressure at rest 94/68 mmHg Blood Pressure post session 118/77 mmHg     Functional Status Score-Intensive Care Unit (FSS-ICU)   Rolling -/7   Supine to sit transfer 5/7   Unsupported sitting  5/7   Sit to stand transfers 5/7   Ambulation /7   Total  20/35       Therapeutic Exercises:  NA    Patient education  Pt educated on safety    Patient response to education:   Pt verbalized understanding Pt demonstrated skill Pt requires further education in this area   x x x     ASSESSMENT:    Conditions Requiring Skilled Therapeutic Intervention:    [x]Decreased strength     [x]Decreased ROM  [x]Decreased functional mobility  [x]Decreased balance   [x]Decreased endurance   []Decreased posture  []Decreased sensation  [x]Decreased coordination   []Decreased vision  []Decreased safety awareness   []Increased pain       Comments:  NP reported pt was stable for session. Pt was in bed upon arrival, agreeable to initial evaluation. Minimal use noted of LUE as pt reported continued weakness but improving. Upon sitting EOB, pt requested to use bathroom. Assisted pt to toilet and pt completed hygiene care without physical assistance. Once finished, pt ambulated into hallway with slightly decreased gait speed but no LOB noted. Pt was left in chair with all needs met and call light in reach. All lines remained intact. Family present in room. Reviewed LUE exercises to complete. Treatment:  Patient practiced and was instructed in the following treatment:     Bed mobility training - pt given verbal cues to facilitate proper sequencing and safety during supine>sit to complete task    Sitting EOB for >3 minutes for upright tolerance, postural awareness and BLE ROM   Transfer training - pt was given verbal cues to facilitate proper hand placement, technique and safety during sit to stand and stand to sit to complete task.  Gait training- pt was given verbal cues to facilitate safety during ambulation to complete task. Pt's/ family goals   1. Return home    Prognosis is good for reaching above PT goals. Patient and or family understand(s) diagnosis, prognosis, and plan of care.   Yes    PHYSICAL THERAPY PLAN OF CARE:    PT POC is established based on physician order and patient diagnosis     Referring provider/PT Order:  Rhett Germain MD / PT eval and treat  Diagnosis:  Acute cerebrovascular accident (CVA) ([facility] [address] [I63.9]  Specific instructions for next treatment:  LUE strengthening/exercises    Current Treatment Recommendations:     [x] Strengthening to improve independence with functional mobility   [x] ROM to improve independence with functional mobility   [x] Balance Training to improve static/dynamic balance and to reduce fall risk  [x] Endurance Training to improve activity tolerance during functional mobility   [x] Transfer Training to improve safety and independence with all functional transfers   [x] Gait Training to improve gait mechanics, endurance and asses need for appropriate assistive device  [x] Stair Training in preparation for safe discharge home and/or into the community   [] Positioning to prevent skin breakdown and contractures  [x] Safety and Education Training   [x] Patient/Caregiver Education   [] HEP  [] Other     PT long term treatment goals are located in above grid    Frequency of treatments: 2-5x/week x 1-2 weeks. Time in  1130  Time out  1150    Total Treatment Time  10 minutes     Evaluation Time includes thorough review of current medical information, gathering information on past medical history/social history and prior level of function, completion of standardized testing/informal observation of tasks, assessment of data and education on plan of care and goals.     CPT codes:  [x] Low Complexity PT evaluation 32343  [] Moderate Complexity PT evaluation 36908  [] High Complexity PT evaluation 64728  [] PT Re-evaluation 28203  [] Gait training 84195 - minutes  [] Manual therapy 22616 - minutes  [x] Therapeutic activities 50776 10 minutes  [] Therapeutic exercises 03639 - minutes  [] Neuromuscular reeducation 95426 - minutes     Tate Rausch, PT, DPT  [id number]

## 2021-05-23 NOTE — PROGRESS NOTES
· Endocrine: No acute issues. Monitor BS. · MSK: No acute issues. PT/OT  · Heme: tPA induced coagulopathy. TPA protocol      Pain control/Sedation: oxycodone, Tylenol  DVT prophylaxis: SCDs.    No Lovenox/heparin until  24 hours post tPA  Mouth/Eye care: as needed    Family update: updated over phone   Code status:  Full  Disposition:  ICU    Electronically signed by MERCY Thompson CNP on 5/23/2021 at 8:27 AM

## 2021-05-23 NOTE — PROGRESS NOTES
Hospitalist Progress Note      SYNOPSIS:     Ms. Cathleen Bo is a 50y.o. year old female  who  has a past medical history of Headache(784.0).       She presented to the ER on 21 with complaints of left-sided weakness onset approximately 1-1/2 hours prior to arrival.  Patient states that she was working in the yard this afternoon when her left arm started tingling. She states that within 5 minutes her entire left arm was numb, her left leg was weak and she had burning on the left side of her neck. She states that she eventually felt so \"sick\" that she laid down on the floor. Her daughter called for an ambulance.     Her initial NIH stroke scale score was 6. Vital signs were stable on arrival. Labs were unremarkable. Imaging of the head and neck showed mild atherosclerotic disease with no hemodynamically significant left or right internal carotid stenosis. CT brain perfusion showed no ischemic penumbra. Telestroke was consulted TPA was administered at . She was admitted to neuro intensive. SUBJECTIVE:    Patient seen and examined  Records reviewed. She states that she is feeling well today, \"ecstatic\" that she is doing so well. Stable overnight. No other overnight issues reported. Temp (24hrs), Av.1 °F (36.2 °C), Min:96.9 °F (36.1 °C), Max:98 °F (36.7 °C)    DIET: DIET GENERAL;  CODE: Prior    Intake/Output Summary (Last 24 hours) at 2021 0741  Last data filed at 2021 2220  Gross per 24 hour   Intake 480 ml   Output --   Net 480 ml       Review of Systems  Negative unless otherwise mentioned above. OBJECTIVE:    BP 98/69   Pulse 58   Temp 97.2 °F (36.2 °C) (Temporal)   Resp 16   Ht 5' 8\" (1.727 m)   Wt 190 lb (86.2 kg)   SpO2 96%   BMI 28.89 kg/m²     General appearance: No apparent distress, appears stated age and cooperative. HEENT: Normal cephalic, atraumatic without obvious deformity. Pupils equal, round, and reactive to light.     Neck: Supple, with full range of motion. No jugular venous distention. Trachea midline. Respiratory:  Clear to auscultation bilaterally. No apparent distress. Cardiovascular:  Regular rate and rhythm. S1, S2 without murmurs, rubs, or gallops. PV: Brisk capillary refill. +2 pedal and radial pulses bilaterally. No clubbing, cyanosis, edema of bilateral lower extremities. Abdomen: Soft, non-tender, non-distended. +BS  Musculoskeletal: No obvious deformities or erythematous or edematous joints. Skin: Normal skin color. No rashes or lesions. Neurologic: Left upper extremity weakness, some effort against gravity with ability to make fist and wiggle fingers. No facial droop, no dysarthria or aphasia noted. Psychiatric: Calm and cooperative    Medications:  REVIEWED DAILY    Infusion Medications    sodium chloride       Scheduled Medications    sodium chloride flush  5-40 mL Intravenous 2 times per day    [Held by provider] enoxaparin  40 mg Subcutaneous Daily    rosuvastatin  40 mg Oral Nightly     PRN Meds: oxyCODONE, acetaminophen, sodium chloride flush, sodium chloride, promethazine **OR** ondansetron    Labs:     Recent Labs     05/22/21  1511 05/23/21  0530   WBC 8.6 5.5   HGB 13.2 12.5   HCT 40.9 38.5    216       Recent Labs     05/22/21  1511 05/23/21  0653    140   K 3.6 3.8    105   CO2 23 24   BUN 20 17   CREATININE 0.9 0.8   CALCIUM 9.6 9.2       Recent Labs     05/22/21  1511   PROT 7.6   ALKPHOS 87   ALT 16   AST 20   BILITOT 0.6       Recent Labs     05/22/21  1505   INR 1.0       Recent Labs     05/22/21  1511   TROPONINI <0.01       Chronic labs:    Lab Results   Component Value Date    INR 1.0 05/22/2021       Radiology: REVIEWED DAILY      ASSESSMENT:    Acute ischemic CVA-initial NIH stroke scale score 6 on arrival-no movement of the left arm, left lower extremity drift and mild to moderate sensory loss on the left side. Imaging without any evidence of hemorrhage or ischemic penumbra. Given TPA at . Will require workup as the etiology is unclear. Started on statin. 24 hour post TPA head CT today. Echo ordered. PT/OT.     Former smoker- 20 pack year smoking hx, quit June 2020     Hx right hip fracture s/p percutaneous pinning (2013)     PLAN:     Per Neuro ICU team       DISPOSITION: Continued inpatient care    +++++++++++++++++++++++++++++++++++++++++++++++++  Kirstin Gonzalez, [address] [place] [address]  +++++++++++++++++++++++++++++++++++++++++++++++++  NOTE: This report was transcribed using voice recognition software. Every effort was made to ensure accuracy; however, inadvertent computerized transcription errors may be present.

## 2021-05-23 NOTE — VIRTUAL HEALTH
[facility] Stroke and Vascular Neurology Consult for  [facility] Stroke Alert through [address] @ 317pm 5/22/2021 5/22/2021 10:06 PM    Pt Name: Danica Arreaga  MRN: [id number]  YOB: 1965  Date of evaluation: 5/22/2021  Primary Care Physician: Williams Aguiar MD  Reason for Evaluation: Stroke evaluation with Phone Consult, Discussion and Review of imaging    52yo female with pmh of chronic headaches, smoker. Pt presents with acute L hemiweakness and numbness, worse in the arm. At baseline pt has no focal deficits. lkn was 115pm 5/22/2021. Pt had sudden onset L arm weakness and numbness while doing yard work. In the ed sbp 136, glu 80. Allergies  has No Known Allergies. Medications  Prior to Admission medications    Medication Sig Start Date End Date Taking? Authorizing Provider   ibuprofen (ADVIL;MOTRIN) 800 MG tablet Take 1 tablet by mouth every 8 hours as needed for Pain. 4/22/15  Yes MERCY Aguirre - CNP   Rizatriptan Benzoate (MAXALT PO) Take  by mouth as needed. Yes Historical Provider, MD    Scheduled Meds:   sodium chloride flush  5-40 mL Intravenous 2 times per day    [START ON 5/23/2021] enoxaparin  40 mg Subcutaneous Daily    rosuvastatin  40 mg Oral Nightly     Continuous Infusions:   sodium chloride       PRN Meds:.sodium chloride flush, sodium chloride flush, sodium chloride, dextrose, promethazine **OR** ondansetron  Past Medical History   has a past medical history of Headache(784.0).   Social History  Social History     Socioeconomic History    Marital status:      Spouse name: Not on file    Number of children: Not on file    Years of education: Not on file    Highest education level: Not on file   Occupational History    Not on file   Tobacco Use    Smoking status: Current Some Day Smoker     Packs/day: 1.00     Years: 20.00     Pack years: 20.00    Smokeless tobacco: Never Used    Tobacco comment: not right now Substance and Sexual Activity    Alcohol use: No     Comment: social    Drug use: No    Sexual activity: Not on file   Other Topics Concern    Not on file   Social History Narrative    Not on file     Social Determinants of Health     Financial Resource Strain:     Difficulty of Paying Living Expenses:    Food Insecurity:     Worried About Running Out of Food in the Last Year:     [address] in the Last Year:    Transportation Needs:     Lack of Transportation (Medical):  Lack of Transportation (Non-Medical):    Physical Activity:     Days of Exercise per Week:     Minutes of Exercise per Session:    Stress:     Feeling of Stress :    Social Connections:     Frequency of Communication with Friends and Family:     Frequency of Social Gatherings with Friends and Family:     Attends Sikh Services:     Active Member of Clubs or Organizations:     Attends Club or Organization Meetings:     Marital Status:    Intimate Partner Violence:     Fear of Current or Ex-Partner:     Emotionally Abused:     Physically Abused:     Sexually Abused:      Family History      Problem Relation Age of Onset    High Blood Pressure Mother     Stroke Father     Bleeding Prob Father        OBJECTIVE  Vitals:    21 2030   BP:    Pulse: 78   Resp:    Temp:    SpO2:         Pt not seen in person, nihss calculated based on ed md report. NIH Stroke Scale:   1a  Level of consciousness: 0 - alert; keenly responsive   1b. LOC questions:  0 - answers both questions correctly   1c. LOC commands: 0 - performs both tasks correctly   2. Best Gaze: 0 - normal   3. Visual: 0 - no visual loss   4. Facial Palsy: 0 - normal symmetric movement   5a. Motor left arm: 4 - no movement   5b. Motor right arm: 0 - no drift, limb holds 90 (or 45) degrees for full 10 seconds   6a.  Motor left le - drift; leg falls by the end of the 5 second period but does not hit bed   6b  Motor right le - no drift; leg holds 30 degree position for full 5 seconds   7. Limb Ataxia: 0 - absent   8. Sensory: 1 - mild to moderate sensory loss; patient feels pinprick is less sharp or is dull on the affected side; there is a loss of superficial pain with pinprick but patient is aware of being touched    9. Best Language:  0 - no aphasia, normal   10. Dysarthria: 0 - normal   11. Extinction and Inattention: 0 - no abnormality         Total:   6     Premorbid MRS: 0      Imaging:  Images were personally reviewed with BAKARI. AI used to review images including:  CT brain without contrast: no large territory hypodensity or bleed  CTA imaging: no LVO  Ctp: no mismtach    Assessment  52yo female with pmh of chronic headaches, smoker. Pt presents with acute L hemiweakness and numbness, worse in the arm. CTH CTA and CTP no acute changes. Small acute stroke cannot be ruled out. ddx includes complicated migraine. Recommendations:  --tpa given. --no mt, no lvo  --admit to [facility] no txf, nsicu for post tpa care  --sbp < 185 x24h, then sbp < 140  --stroke care and workup as per neuro    Discussed with ED Physician    At least 30 min of Telemedicine and time in conversation directly with ED staff and physician for the patient who is in imminent and life threatening deterioration without further treatment and evaluation. This Virtual Visit was conducted with patient's (and/or legal guardian's) consent, to provide telestroke consultation and necessary medical care. Time spent examining patient, reviewing the images personally, reviewing the chart, perform high complexity decision making and speaking with the nursing staff regarding recommendations      This is a Phone Consult, I have not seen the patient face to face, the telemedicine device was not utilized.     Liliane Pulliam MD, MD PhD  Stroke, Neurocritical Care And/or [address] [place] [address] Stroke [address] [place] [address] [place]os  Electronically signed 5/22/2021 at 10:06 PM

## 2021-05-24 ENCOUNTER — APPOINTMENT (OUTPATIENT)
Dept: MRI IMAGING | Age: 56
DRG: 045 | End: 2021-05-24
Payer: COMMERCIAL

## 2021-05-24 PROBLEM — R29.90 STROKE-LIKE SYMPTOMS: Status: ACTIVE | Noted: 2021-05-22

## 2021-05-24 PROBLEM — G43.909 MIGRAINE: Status: ACTIVE | Noted: 2021-05-24

## 2021-05-24 LAB
ANION GAP SERPL CALCULATED.3IONS-SCNC: 10 MMOL/L (ref 7–16)
BUN BLDV-MCNC: 18 MG/DL (ref 6–20)
CALCIUM SERPL-MCNC: 8.8 MG/DL (ref 8.6–10.2)
CHLORIDE BLD-SCNC: 104 MMOL/L (ref 98–107)
CO2: 24 MMOL/L (ref 22–29)
CREAT SERPL-MCNC: 0.8 MG/DL (ref 0.5–1)
GFR AFRICAN AMERICAN: >60
GFR NON-AFRICAN AMERICAN: >60 ML/MIN/1.73
GLUCOSE BLD-MCNC: 93 MG/DL (ref 74–99)
HCT VFR BLD CALC: 40 % (ref 34–48)
HEMOGLOBIN: 12.5 G/DL (ref 11.5–15.5)
MAGNESIUM: 2.2 MG/DL (ref 1.6–2.6)
MCH RBC QN AUTO: 28.5 PG (ref 26–35)
MCHC RBC AUTO-ENTMCNC: 31.3 % (ref 32–34.5)
MCV RBC AUTO: 91.1 FL (ref 80–99.9)
PDW BLD-RTO: 13.4 FL (ref 11.5–15)
PHOSPHORUS: 3.9 MG/DL (ref 2.5–4.5)
PLATELET # BLD: 204 E9/L (ref 130–450)
PMV BLD AUTO: 11.9 FL (ref 7–12)
POTASSIUM SERPL-SCNC: 5.4 MMOL/L (ref 3.5–5)
RBC # BLD: 4.39 E12/L (ref 3.5–5.5)
SODIUM BLD-SCNC: 138 MMOL/L (ref 132–146)
WBC # BLD: 5.7 E9/L (ref 4.5–11.5)

## 2021-05-24 PROCEDURE — 97535 SELF CARE MNGMENT TRAINING: CPT

## 2021-05-24 PROCEDURE — 6360000002 HC RX W HCPCS: Performed by: INTERNAL MEDICINE

## 2021-05-24 PROCEDURE — 6370000000 HC RX 637 (ALT 250 FOR IP): Performed by: SURGERY

## 2021-05-24 PROCEDURE — 97165 OT EVAL LOW COMPLEX 30 MIN: CPT

## 2021-05-24 PROCEDURE — 2060000000 HC ICU INTERMEDIATE R&B

## 2021-05-24 PROCEDURE — 36415 COLL VENOUS BLD VENIPUNCTURE: CPT

## 2021-05-24 PROCEDURE — 92507 TX SP LANG VOICE COMM INDIV: CPT | Performed by: SPEECH-LANGUAGE PATHOLOGIST

## 2021-05-24 PROCEDURE — 99233 SBSQ HOSP IP/OBS HIGH 50: CPT | Performed by: NURSE PRACTITIONER

## 2021-05-24 PROCEDURE — 83735 ASSAY OF MAGNESIUM: CPT

## 2021-05-24 PROCEDURE — 80048 BASIC METABOLIC PNL TOTAL CA: CPT

## 2021-05-24 PROCEDURE — 70551 MRI BRAIN STEM W/O DYE: CPT

## 2021-05-24 PROCEDURE — 6370000000 HC RX 637 (ALT 250 FOR IP): Performed by: EMERGENCY MEDICINE

## 2021-05-24 PROCEDURE — 84100 ASSAY OF PHOSPHORUS: CPT

## 2021-05-24 PROCEDURE — 85027 COMPLETE CBC AUTOMATED: CPT

## 2021-05-24 PROCEDURE — 92523 SPEECH SOUND LANG COMPREHEN: CPT | Performed by: SPEECH-LANGUAGE PATHOLOGIST

## 2021-05-24 PROCEDURE — 2580000003 HC RX 258: Performed by: EMERGENCY MEDICINE

## 2021-05-24 PROCEDURE — 6370000000 HC RX 637 (ALT 250 FOR IP): Performed by: NURSE PRACTITIONER

## 2021-05-24 RX ORDER — ASPIRIN 81 MG/1
81 TABLET, CHEWABLE ORAL DAILY
Status: DISCONTINUED | OUTPATIENT
Start: 2021-05-24 | End: 2021-05-25 | Stop reason: HOSPADM

## 2021-05-24 RX ORDER — LORAZEPAM 2 MG/ML
1 INJECTION INTRAMUSCULAR ONCE
Status: COMPLETED | OUTPATIENT
Start: 2021-05-24 | End: 2021-05-24

## 2021-05-24 RX ADMIN — OXYCODONE 5 MG: 5 TABLET ORAL at 15:21

## 2021-05-24 RX ADMIN — ASPIRIN 81 MG CHEWABLE TABLET 81 MG: 81 TABLET CHEWABLE at 15:18

## 2021-05-24 RX ADMIN — LORAZEPAM 1 MG: 2 INJECTION INTRAMUSCULAR; INTRAVENOUS at 11:58

## 2021-05-24 RX ADMIN — Medication 10 ML: at 20:24

## 2021-05-24 RX ADMIN — ROSUVASTATIN 40 MG: 20 TABLET, FILM COATED ORAL at 20:22

## 2021-05-24 ASSESSMENT — PAIN SCALES - GENERAL
PAINLEVEL_OUTOF10: 0
PAINLEVEL_OUTOF10: 0
PAINLEVEL_OUTOF10: 8
PAINLEVEL_OUTOF10: 0

## 2021-05-24 NOTE — CARE COORDINATION
Contacted pt's daughter Abhishek Marquez to discuss discharge planning as it was noted in chart that pt has a migraine headache today. Did not disturb pt. Per Abhishek Marquez, her other sister Buddy Richard lives with pt in a 1 story home with 2 steps to enter. Sana's backyard meets theirs, so she is also close by if needed. Pt is independent in adl's. She still has sl L sided weakness which is improving. Pt ambulated with  ft yesterday with no assistive device. Pt has a hx of outpt rehab at Pending sale to [facility] in [place] after hip surgery. MRI brain pending. Plan at this time is to return home at discharge. One of her daughters will transport her home.

## 2021-05-24 NOTE — PROGRESS NOTES
Indra Brambila is a 50 y.o. right handed female     Neurology is following for stroke-like symptoms    PMH: Migraines, smoking    Presented with the sudden onset of left-sided weakness and numbness--associated with migraine headache. NIH of 6 and imaging all negative. Received TPA with mild improvement. Functional components on exam.    She is now on stepdown     Post TPA CT of the head showed no bleed. MRI of the brain is pending. She continues to note weakness in her left arm and leg that has improved somewhat. She now has a throbbing, pulsing migraine headache in her occipital region radiating to the back of her eyes. This is her typical migraine and she says it began shortly after she arrived to the ER. It is associated with nausea and photophobia. She has never had focal deficits with her previous migraines, which typically occur once monthly. She admits to a significant amount of stress recently since she is going through divorce. Her daughter is at the bedside. She is otherwise medically stable.     Current Facility-Administered Medications   Medication Dose Route Frequency Provider Last Rate Last Admin    LORazepam (ATIVAN) injection 1 mg  1 mg Intravenous Once Jem Rivero MD        oxyCODONE (ROXICODONE) immediate release tablet 5 mg  5 mg Oral Q4H PRN Kannan Guzman MD   5 mg at 05/23/21 1235    acetaminophen (TYLENOL) tablet 650 mg  650 mg Oral Q4H PRN Kannan Guzman MD   650 mg at 05/23/21 0130    sodium chloride flush 0.9 % injection 5-40 mL  5-40 mL Intravenous 2 times per day Charu Mendez MD   5 mL at 05/23/21 2100    sodium chloride flush 0.9 % injection 5-40 mL  5-40 mL Intravenous PRN Charu Mendez MD        0.9 % sodium chloride infusion  25 mL Intravenous PRN Charu Mendez MD        [Held by provider] enoxaparin (LOVENOX) injection 40 mg  40 mg Subcutaneous Daily Charu Mendez MD        promethazine (PHENERGAN) tablet 12.5 mg  12.5 mg Oral Q6H PRN Nettie Fan MD Elizabeth        Or    ondansetron (ZOFRAN) injection 4 mg  4 mg Intravenous Q6H PRN Cori Mc MD        rosuvastatin (CRESTOR) tablet 40 mg  40 mg Oral Nightly Cori Mc MD   40 mg at 05/23/21 2300     Objective:     /68   Pulse 66   Temp 97.7 °F (36.5 °C) (Temporal)   Resp 16   Ht 5' 8\" (1.727 m)   Wt 190 lb (86.2 kg)   SpO2 95%   BMI 28.89 kg/m²     General appearance: alert, appears stated age, cooperative --tearful  Head: normocephalic, without obvious abnormality, atraumatic  Eyes: conjunctivae/corneas clear.  .  Neck:  no carotid bruit,   Lungs: clear to auscultation bilaterally  Heart: regular rate and rhythm,  Extremities: normal, atraumatic, no cyanosis or edema  Pulses: 2+ and symmetric  Skin: color, texture, turgor normal---no rashes or lesions      Mental Status: Alert, oriented x4--tearful and emotional    Appropriate attention/concentration  Intact fundus of knowledge  Intact memories    Speech: no dysarthria  Language: no aphasias    Cranial Nerves:  I: smell NA   II: visual acuity  NA   II: visual fields Full to confrontation   II: pupils MECHE   III,VII: ptosis None   III,IV,VI: extraocular muscles  Full ROM   V: mastication Normal   V: facial light touch sensation  Normal   V,VII: corneal reflex     VII: facial muscle function - upper  Normal   VII: facial muscle function - lower Normal   VIII: hearing Normal   IX: soft palate elevation  Normal   IX,X: gag reflex    XI: trapezius strength  5/5   XI: sternocleidomastoid strength 5/5   XI: neck extension strength  5/5   XII: tongue strength  Normal     Motor:  Left hemiparesis--sudden giving way left arm  Normal bulk and tone  No abnormal movements    Sensory:  LT decreased L arm and leg    Coordination:   FN and FFM impaired on L relative to weakness    DTR:   2+ throughout    No Babinskis  No Beard's    No pathological reflexes    Laboratory/Radiology:     CBC with Differential:    Lab Results   Component Value Date WBC 5.7 05/24/2021    RBC 4.39 05/24/2021    HGB 12.5 05/24/2021    HCT 40.0 05/24/2021     05/24/2021    MCV 91.1 05/24/2021    MCH 28.5 05/24/2021    MCHC 31.3 05/24/2021    RDW 13.4 05/24/2021    LYMPHOPCT 21.8 05/22/2021    MONOPCT 7.2 05/22/2021    BASOPCT 0.6 05/22/2021    MONOSABS 0.62 05/22/2021    LYMPHSABS 1.87 05/22/2021    EOSABS 0.07 05/22/2021    BASOSABS 0.05 05/22/2021     CMP:    Lab Results   Component Value Date     05/24/2021    K 5.4 05/24/2021    K 3.7 06/09/2019     05/24/2021    CO2 24 05/24/2021    BUN 18 05/24/2021    CREATININE 0.8 05/24/2021    GFRAA >60 05/24/2021    LABGLOM >60 05/24/2021    GLUCOSE 93 05/24/2021    PROT 7.6 05/22/2021    LABALBU 4.4 05/22/2021    CALCIUM 8.8 05/24/2021    BILITOT 0.6 05/22/2021    ALKPHOS 87 05/22/2021    AST 20 05/22/2021    ALT 16 05/22/2021     CT the head: Unremarkable    CTAs head and neck and CTP: Unremarkable    MRI brain pending    All labs and images personally reviewed today    Assessment:     Complex migraine versus stroke: Symptoms have improved, but left-sided weakness persists post TPA. I agree that there are functional components to her exam, and she currently is suffering from a typical migraine. Will start antiplatelet for now, but may not need if MRI is negative.     Plan:     Await MRI brain    Start aspirin 81 mg for now--may not need if MRI negative    Discussed with her daughter    We will follow    MERCY Silveira - CNP  11:23 AM  5/24/2021

## 2021-05-24 NOTE — PROGRESS NOTES
SPEECH/LANGUAGE PATHOLOGY  SPEECH/LANGUAGE/COGNITIVE EVALUATION   and PLAN OF CARE      PATIENT NAME:  Indra Brambila  (female)     MRN:  [id number]    :  1965  (50 y.o.)  STATUS:  Inpatient: Room 8501/8501-A    TODAY'S DATE:  2021  REFERRING PROVIDER:    Charu Mendez MD   SPECIFIC PROVIDER ORDER: SLP eval and treat  Date of order:  21  REASON FOR REFERRAL: stroke like symptoms  EVALUATING THERAPIST: MIGUE Benítez    ADMITTING DIAGNOSIS: Acute cerebrovascular accident (CVA) ([facility] [address] [I63.9]    VISIT DIAGNOSIS:      SPEECH THERAPY  PLAN OF CARE   The speech therapy  POC is established based on physician order, speech pathology diagnosis and results of clinical assessment     SPEECH PATHOLOGY DIAGNOSIS:    Functional Cognitive Linguistic Skills    Speech Pathology intervention is not warranted at this time. Conditions Requiring Skilled Therapeutic Intervention for speech, language and/or cognition    Not applicable     Specific Speech Therapy Interventions to Include:   Not applicable    Specific instructions for next treatment:  n/a    SHORT/LONG TERM GOALS  n/a  Patient stated goals: Agreed with above,     Rehabilitation Potential/Prognosis: good                CLINICAL ASSESSMENT:  MOTOR SPEECH       Oral Peripheral Examination   Adequate lingual/labial strength ; very slight R lingual deviation only    Parameters of Speech Production  Respiration:  Adequate for speech production  Articulation:  Within functional limits  Resonance:  Within functional limits  Quality:   Within functional limits  Pitch: Within functional limits  Intensity: Within functional limits  Fluency:  Intact  Prosody Intact    RECEPTIVE LANGUAGE    Comprehension of Yes/No Questions:    Within functional limits    Process  Simple Verbal Commands:   Within functional limits  Process Intermediate Verbal Commands:   Within functional limits  Process Complex Verbal Commands:     Within functional limits    Comprehension of Conversation:      Within functional limits      EXPRESSIVE LANGUAGE     Serials: Functional    Imitation:  Words   Functional   Sentences Functional    Naming:  (Modality used:  Verbal)  Confrontation Naming  Functional  Functional Description  Functional  Response Naming: Functional    Conversation:      Conversation was within functional limits    COGNITION     Attention/Orientation  Attention: Sustained attention   Orientation:  Oriented to Person, Place, Date, Reason for hospitalization    Memory   Immediate Recall: Repeated 3/3    Delayed Recall:   Recalled 3/3 with min cues    Long Term Recall:   Recalled Address, Birthdate, Age and Family    Organization/Problem Solving/Reasoning   Verbal Sequencing:   Functional        Verbal Problem solving:   Functional          CLINICAL OBSERVATIONS NOTED DURING THE EVALUATION  Within functional limits                  EDUCATION:   The Speech Language Pathologist (SLP) completed education regarding results of evaluation and that intervention is not warranted at this time. Learner: Patient and Family  Education: Reviewed results and recommendations of this evaluation, Reviewed ST goals and Plan of Care and Reviewed recommendations for follow-up  Evaluation of Education:  Verbalizes understanding    Explained that Speech Pathology intervention is not warranted at this time   This plan may be re-evaluated and revised as warranted. Treatment goals discussed with Patient and Family   The Patient and Family understand(s) the diagnosis, prognosis and plan of care     Evaluation Time includes thorough review of current medical information, gathering information on past medical history/social history and prior level of function, completion of standardized testing/informal observation of tasks, assessment of data and education on plan of care and goals.       CPT code:    66746  eval speech sound lang comprehension / 77411  therapeutic interventions

## 2021-05-24 NOTE — PROGRESS NOTES
Hospitalist Progress Note      SYNOPSIS:     Ms. Indra Brambila is a 50y.o. year old female  who  has a past medical history of Headache(784.0).       She presented to the ER on 21 with complaints of left-sided weakness onset approximately 1-1/2 hours prior to arrival.  Patient states that she was working in the yard this afternoon when her left arm started tingling. She states that within 5 minutes her entire left arm was numb, her left leg was weak and she had burning on the left side of her neck. She states that she eventually felt so \"sick\" that she laid down on the floor. Her daughter called for an ambulance.     Her initial NIH stroke scale score was 6. Vital signs were stable on arrival. Labs were unremarkable. Imaging of the head and neck showed mild atherosclerotic disease with no hemodynamically significant left or right internal carotid stenosis. CT brain perfusion showed no ischemic penumbra. Telestroke was consulted TPA was administered at . She was admitted to neuro intensive. SUBJECTIVE:    Patient is out of the ICU  Continues to have left upper extremity weakness  She does have underlying migraine history  Last migraine attack was 2 days prior to presentation    Temp (24hrs), Av °F (36.1 °C), Min:96.3 °F (35.7 °C), Max:97.7 °F (36.5 °C)    DIET: DIET GENERAL;  CODE: Prior    Intake/Output Summary (Last 24 hours) at 2021 0919  Last data filed at 2021 0600  Gross per 24 hour   Intake 960 ml   Output --   Net 960 ml       Review of Systems  Negative unless otherwise mentioned above. OBJECTIVE:    /68   Pulse 66   Temp 97.7 °F (36.5 °C) (Temporal)   Resp 16   Ht 5' 8\" (1.727 m)   Wt 190 lb (86.2 kg)   SpO2 95%   BMI 28.89 kg/m²     General appearance: No apparent distress, appears stated age and cooperative. HEENT: Normal cephalic, atraumatic without obvious deformity. Pupils equal, round, and reactive to light.     Neck: Supple, with full range of motion. No jugular venous distention. Trachea midline. Respiratory:  Clear to auscultation bilaterally. No apparent distress. Cardiovascular:  Regular rate and rhythm. S1, S2 without murmurs, rubs, or gallops. PV: Brisk capillary refill. +2 pedal and radial pulses bilaterally. No clubbing, cyanosis, edema of bilateral lower extremities. Abdomen: Soft, non-tender, non-distended. +BS  Musculoskeletal: No obvious deformities or erythematous or edematous joints. Skin: Normal skin color. No rashes or lesions. Neurologic: Left upper extremity weakness, some effort against gravity with ability to make fist and wiggle fingers. No facial droop, no dysarthria or aphasia noted.   Psychiatric: Calm and cooperative    Medications:  REVIEWED DAILY    Infusion Medications    sodium chloride       Scheduled Medications    sodium chloride flush  5-40 mL Intravenous 2 times per day    [Held by provider] enoxaparin  40 mg Subcutaneous Daily    rosuvastatin  40 mg Oral Nightly     PRN Meds: oxyCODONE, acetaminophen, sodium chloride flush, sodium chloride, promethazine **OR** ondansetron    Labs:     Recent Labs     05/22/21  1511 05/23/21  0530 05/24/21  0452   WBC 8.6 5.5 5.7   HGB 13.2 12.5 12.5   HCT 40.9 38.5 40.0    216 204       Recent Labs     05/23/21  0650 05/23/21  0653 05/24/21  0452    140 138   K 4.0 3.8 5.4*    105 104   CO2 23 24 24   BUN 16 17 18   CREATININE 0.7 0.8 0.8   CALCIUM 8.9 9.2 8.8   PHOS 4.3  --  3.9       Recent Labs     05/22/21  1511   PROT 7.6   ALKPHOS 87   ALT 16   AST 20   BILITOT 0.6       Recent Labs     05/22/21  1505   INR 1.0       Recent Labs     05/22/21  1511   TROPONINI <0.01       Chronic labs:    Lab Results   Component Value Date    INR 1.0 05/22/2021       Radiology: REVIEWED DAILY      ASSESSMENT:    Acute ischemic CVA-initial NIH stroke scale score 6 on arrival-no movement of the left arm, left lower extremity drift and mild to moderate sensory loss on

## 2021-05-24 NOTE — PROGRESS NOTES
[address]      Date:2021                                                  Patient Name: Cari Parnell  MRN: [id number]  : 1965  Room: [address] [place] [address]    Evaluating OT: CHRISTOPHER Root, OTR/L [id number]  Referring Provider:Olivier Dubon MD  Specific Provider Orders: OT eval and treat   Recommended Adaptive Equipment: Outpatient OT, shower chair, home exercise program given to pt     Diagnosis: CVA   Surgery: n/a   Pertinent Medical History: none on file   Precautions:  Fall Risk, L hemiparesis    Assessment of current deficits   [x] Functional mobility  [x]ADLs  [x] Strength               [x]Cognition   [x] Functional transfers   [x] IADLs         [x] Safety Awareness   [x]Endurance   [] Fine Coordination              [x] Balance      [] Vision/perception   [x]Sensation    []Gross Motor Coordination  [] ROM  [] Delirium                   [] Motor Control     OT PLAN OF CARE   OT POC based on physician orders, patient diagnosis and results of clinical assessment    Frequency/Duration:2-3 days/wk for 2 weeks PRN   Specific OT Treatment to include:    Instruction/training on adapted ADL techniques and AE recommendations to increase functional independence within precautions  Training on energy conservation strategies, correct breathing pattern and techniques to improve independence/tolerance for self-care routine  Functional transfer/mobility training/DME recommendations for increased independence, safety, and fall prevention  Patient/Family education to increase follow through with safety techniques and functional independence  Recommendation of environmental modifications for increased safety with functional transfers/mobility and ADLs  Therapeutic exercise to improve motor endurance, ROM, and functional strength for ADLs/functional transfers  Therapeutic activities to facilitate/challenge dynamic Log roll: SUP  Supine to sit: SUP   Sit to supine: SUP   Log roll IND  Supine to sit: IND   Sit to supine: IND   Functional Transfers Sit to stand:SBA   Stand to sit:SBA  Commode: SBA  IND   Functional Mobility SBA (using no AD, to/from bathroom)  IND   Balance Sitting: SUP  Standing: SBA     Activity Tolerance fair     Visual/  Perceptual Glasses: for reading     pt able to read clock on wall    visual tracking appears WFL              Hand dominance: R  UE ROM: RUE: WFL  LUE:  WFL with AAROM, pt can shrug shoulder  Strength: RUE: grossly 4-/5 LUE: grossly 2/5   Strength: L<R  Fine Motor Coordination:  Pt can oppose 1st-3rd digits    Hearing: WFL  Sensation:  No c/o numbness/tingling  Tone:  WFL  Edema: none noted                            Comments:Cleared by RN to see pt. Upon arrival, patient supine in bed and agreeable to OT session. At end of session, patient supine in bed with call light and phone within reach, all lines and tubes intact. Pt would benefit from continued OT to increase functional independence and quality of life. Treatment: Completed ADLs/functioanl tranfers, see above for assessment. Pt educated on onelia dressing and 1 handed dressing techniques. Educated on safe/proper positioning of LUE with mobility. Pt given resistive sponges and thoroughly educated on AROM/SROM home exercise program to increase LUE ROM. Daughter present and was educated on [address]. Pt educated on getting clearance before resuming driving. Pt educated on shower chair for ease with bathing task and energy conservation at home. Pt appeared to have tolerated session well and appears motivated/cooperative/pleasant . Pt demo'ing fair understanding of education provided. Continue to educate. Eval Complexity: Low    Rehab Potential: Good for established goals, pt. assisted in establishment of goals.     LTG: maximize independence with ADLs to return to PLOF    Patient and/or family were instructed on diagnosis,

## 2021-05-25 VITALS
HEART RATE: 72 BPM | OXYGEN SATURATION: 97 % | TEMPERATURE: 96.6 F | RESPIRATION RATE: 18 BRPM | WEIGHT: 190 LBS | SYSTOLIC BLOOD PRESSURE: 100 MMHG | HEIGHT: 68 IN | BODY MASS INDEX: 28.79 KG/M2 | DIASTOLIC BLOOD PRESSURE: 66 MMHG

## 2021-05-25 LAB
ANION GAP SERPL CALCULATED.3IONS-SCNC: 8 MMOL/L (ref 7–16)
BUN BLDV-MCNC: 19 MG/DL (ref 6–20)
CALCIUM SERPL-MCNC: 8.9 MG/DL (ref 8.6–10.2)
CHLORIDE BLD-SCNC: 103 MMOL/L (ref 98–107)
CO2: 27 MMOL/L (ref 22–29)
CREAT SERPL-MCNC: 0.9 MG/DL (ref 0.5–1)
GFR AFRICAN AMERICAN: >60
GFR NON-AFRICAN AMERICAN: >60 ML/MIN/1.73
GLUCOSE BLD-MCNC: 93 MG/DL (ref 74–99)
HCT VFR BLD CALC: 39.6 % (ref 34–48)
HEMOGLOBIN: 12.5 G/DL (ref 11.5–15.5)
MAGNESIUM: 2 MG/DL (ref 1.6–2.6)
MCH RBC QN AUTO: 28.3 PG (ref 26–35)
MCHC RBC AUTO-ENTMCNC: 31.6 % (ref 32–34.5)
MCV RBC AUTO: 89.6 FL (ref 80–99.9)
PDW BLD-RTO: 13.2 FL (ref 11.5–15)
PHOSPHORUS: 3.6 MG/DL (ref 2.5–4.5)
PLATELET # BLD: 198 E9/L (ref 130–450)
PMV BLD AUTO: 11.2 FL (ref 7–12)
POTASSIUM SERPL-SCNC: 4.2 MMOL/L (ref 3.5–5)
RBC # BLD: 4.42 E12/L (ref 3.5–5.5)
SODIUM BLD-SCNC: 138 MMOL/L (ref 132–146)
WBC # BLD: 7.7 E9/L (ref 4.5–11.5)

## 2021-05-25 PROCEDURE — 2580000003 HC RX 258: Performed by: EMERGENCY MEDICINE

## 2021-05-25 PROCEDURE — 36415 COLL VENOUS BLD VENIPUNCTURE: CPT

## 2021-05-25 PROCEDURE — 83735 ASSAY OF MAGNESIUM: CPT

## 2021-05-25 PROCEDURE — 6370000000 HC RX 637 (ALT 250 FOR IP): Performed by: SURGERY

## 2021-05-25 PROCEDURE — 6370000000 HC RX 637 (ALT 250 FOR IP): Performed by: NURSE PRACTITIONER

## 2021-05-25 PROCEDURE — 99233 SBSQ HOSP IP/OBS HIGH 50: CPT | Performed by: NURSE PRACTITIONER

## 2021-05-25 PROCEDURE — 80048 BASIC METABOLIC PNL TOTAL CA: CPT

## 2021-05-25 PROCEDURE — 84100 ASSAY OF PHOSPHORUS: CPT

## 2021-05-25 PROCEDURE — 97530 THERAPEUTIC ACTIVITIES: CPT

## 2021-05-25 PROCEDURE — 85027 COMPLETE CBC AUTOMATED: CPT

## 2021-05-25 RX ORDER — ROSUVASTATIN CALCIUM 40 MG/1
40 TABLET, COATED ORAL NIGHTLY
Qty: 30 TABLET | Refills: 3 | Status: SHIPPED | OUTPATIENT
Start: 2021-05-25

## 2021-05-25 RX ORDER — ASPIRIN 81 MG/1
81 TABLET, CHEWABLE ORAL DAILY
Qty: 30 TABLET | Refills: 3 | Status: SHIPPED | OUTPATIENT
Start: 2021-05-26

## 2021-05-25 RX ADMIN — Medication 10 ML: at 08:55

## 2021-05-25 RX ADMIN — OXYCODONE 5 MG: 5 TABLET ORAL at 00:23

## 2021-05-25 RX ADMIN — ASPIRIN 81 MG CHEWABLE TABLET 81 MG: 81 TABLET CHEWABLE at 08:53

## 2021-05-25 ASSESSMENT — PAIN SCALES - GENERAL: PAINLEVEL_OUTOF10: 7

## 2021-05-25 NOTE — PROGRESS NOTES
Called and spoke with patient's PCP Dr. Marlena Cueva and updated him on neuro work-up findings, exams findings, and suspected diagnoses.

## 2021-05-25 NOTE — PROGRESS NOTES
D. Informed by patient that her primary care physician would like to speak with discharging MD-Dr. Marlo Robert prior to discharge and the primary care left a phone number for the discharging md. Request made to charge nurse who stated neurology has signed off and pt is to be discharge per dr. Marlo Robert. A. Informed pt that primary md should contact dr. Marlo Robert and discharge will proceed as ordered by Dr. Marlo Robert. R. Pt denies any concerns at this time and agrees with discharge.

## 2021-05-25 NOTE — PROGRESS NOTES
Message sent to Dr. Loly Lund regarding if patient is still stable for discharge. Per Dr. Loly Lund check with neurology prior to discharge.  Message sent to Tish Carrasco with neurology regarding if patient is stable for discharge

## 2021-05-25 NOTE — PROGRESS NOTES
Hospitalist Progress Note      SYNOPSIS:     Ms. Cari Parnell is a 50y.o. year old female  who  has a past medical history of Headache(784.0).       She presented to the ER on 21 with complaints of left-sided weakness onset approximately 1-1/2 hours prior to arrival.  Patient states that she was working in the yard this afternoon when her left arm started tingling. She states that within 5 minutes her entire left arm was numb, her left leg was weak and she had burning on the left side of her neck. She states that she eventually felt so \"sick\" that she laid down on the floor. Her daughter called for an ambulance.     Her initial NIH stroke scale score was 6. Vital signs were stable on arrival. Labs were unremarkable. Imaging of the head and neck showed mild atherosclerotic disease with no hemodynamically significant left or right internal carotid stenosis. CT brain perfusion showed no ischemic penumbra. Telestroke was consulted TPA was administered at . She was admitted to neuro intensive. SUBJECTIVE:    Some headache  No other issues    Temp (24hrs), Av.5 °F (36.4 °C), Min:96.6 °F (35.9 °C), Max:98 °F (36.7 °C)    DIET: DIET GENERAL;  CODE: Prior  No intake or output data in the 24 hours ending 21 1033    Review of Systems  Negative unless otherwise mentioned above. OBJECTIVE:    /66   Pulse 72   Temp 96.6 °F (35.9 °C) (Temporal)   Resp 18   Ht 5' 8\" (1.727 m)   Wt 190 lb (86.2 kg)   SpO2 97%   BMI 28.89 kg/m²     General appearance: No apparent distress, appears stated age and cooperative. HEENT: Normal cephalic, atraumatic without obvious deformity. Pupils equal, round, and reactive to light. Neck: Supple, with full range of motion. No jugular venous distention. Trachea midline. Respiratory:  Clear to auscultation bilaterally. No apparent distress. Cardiovascular:  Regular rate and rhythm. S1, S2 without murmurs, rubs, or gallops.    PV: Brisk capillary refill. +2 pedal and radial pulses bilaterally. No clubbing, cyanosis, edema of bilateral lower extremities. Abdomen: Soft, non-tender, non-distended. +BS  Musculoskeletal: No obvious deformities or erythematous or edematous joints. Skin: Normal skin color. No rashes or lesions. Neurologic: Left upper extremity weakness, some effort against gravity with ability to make fist and wiggle fingers. No facial droop, no dysarthria or aphasia noted. Psychiatric: Calm and cooperative    Medications:  REVIEWED DAILY    Infusion Medications    sodium chloride       Scheduled Medications    aspirin  81 mg Oral Daily    sodium chloride flush  5-40 mL Intravenous 2 times per day    [Held by provider] enoxaparin  40 mg Subcutaneous Daily    rosuvastatin  40 mg Oral Nightly     PRN Meds: oxyCODONE, acetaminophen, sodium chloride flush, sodium chloride, promethazine **OR** ondansetron    Labs:     Recent Labs     05/23/21  0530 05/24/21  0452 05/25/21  0725   WBC 5.5 5.7 7.7   HGB 12.5 12.5 12.5   HCT 38.5 40.0 39.6    204 198       Recent Labs     05/23/21  0650 05/23/21  0653 05/24/21  0452 05/25/21  0725    140 138 138   K 4.0 3.8 5.4* 4.2    105 104 103   CO2 23 24 24 27   BUN 16 17 18 19   CREATININE 0.7 0.8 0.8 0.9   CALCIUM 8.9 9.2 8.8 8.9   PHOS 4.3  --  3.9 3.6       Recent Labs     05/22/21  1511   PROT 7.6   ALKPHOS 87   ALT 16   AST 20   BILITOT 0.6       Recent Labs     05/22/21  1505   INR 1.0       Recent Labs     05/22/21  1511   TROPONINI <0.01       Chronic labs:    Lab Results   Component Value Date    INR 1.0 05/22/2021       Radiology: REVIEWED DAILY      ASSESSMENT:    Acute ischemic CVA-initial NIH stroke scale score 6 on arrival-no movement of the left arm, left lower extremity drift and mild to moderate sensory loss on the left side. Imaging without any evidence of hemorrhage or ischemic penumbra. Given TPA at . Will require workup as the etiology is unclear.  Started on statin. 24 hour post TPA head CT today. Echo ordered. PT/OT.     Former smoker- 20 pack year smoking hx, quit June 2020     Hx right hip fracture s/p percutaneous pinning (2013)     PLAN:     Mri noted  Dc home if ok with neurology       DISPOSITION: dc home    +++++++++++++++++++++++++++++++++++++++++++++++++  Jen Xie MD   [address]  +++++++++++++++++++++++++++++++++++++++++++++++++  NOTE: This report was transcribed using voice recognition software. Every effort was made to ensure accuracy; however, inadvertent computerized transcription errors may be present.

## 2021-05-25 NOTE — PROGRESS NOTES
Physical Therapy  Physical Therapy     Name: Danica Arreaga  : 1965  MRN: [id number]      Date of Service: 2021    Evaluating PT:  Urbano Rain PT, DPT [id number]    Room #:  6558/2629-P  Diagnosis:  Acute cerebrovascular accident (CVA) [facility]) [I63.9]  PMHx/PSHx:  NA  Procedure/Surgery:   tPA  Precautions:  Falls, LUE weakness  Equipment Needs:  None    SUBJECTIVE:    Pt lives with daughter in a 1 story home with 2 stairs to enter and no rail. Full flight of steps and 1 rail to basement. Pt ambulated without device and was independent PTA. OBJECTIVE:   Initial Evaluation  Date: 21 Treatment Short Term/ Long Term   Goals   AM-PAC 6 Clicks / nt    Was pt agreeable to Eval/treatment? Yes Pt declined    Does pt have pain?  No c/o pain     Bed Mobility  Rolling: NT  Supine to sit: SBA with HOB elevated  Sit to supine: NT  Scooting: SBA  Mod Independent   Transfers Sit to stand: SBA  Stand to sit: SBA  Stand pivot: SBA no device  Independent   Ambulation   300 feet with SBA no device  >400 feet Independently   Stair negotiation: ascended and descended NT  >10 steps with 1 rail Mod Independent   ROM BUE:  WFL  BLE:  WFL     Strength BUE:  LUE weakness  BLE:  4+/5  Increase by 1/3 MMT grade   Balance Sitting EOB:  SBA  Dynamic Standing:  SBA no device  Sitting EOB:  Independent  Dynamic Standing:  Independent     Pt is A & O x 4  CAM-ICU: NT  RASS: 0  Sensation:  LUE paresthesias   Edema:  Unremarkable    Vitals:  Heart Rate at rest 72 bpm Heart Rate post session 76 bpm   SpO2 at rest 97% SpO2 post session 96%   Blood Pressure at rest 94/68 mmHg Blood Pressure post session 118/77 mmHg     Functional Status Score-Intensive Care Unit (FSS-ICU)   Rolling -/7   Supine to sit transfer 5/7   Unsupported sitting  5/7   Sit to stand transfers 5/7   Ambulation /   Total  20/35       Therapeutic Exercises:  NA    Patient education  Pt educated on safety    Patient response to education:   Pt verbalized Positioning to prevent skin breakdown and contractures  [x] Safety and Education Training   [x] Patient/Caregiver Education   [] HEP  [] Other     PT long term treatment goals are located in above grid    Frequency of treatments: 2-5x/week x 1-2 weeks.     Time in  130  Time out  144  Total Treatment Time  14 minutes       CPT codes:  [x] Low Complexity PT evaluation 29836  [] Moderate Complexity PT evaluation 08070  [] High Complexity PT evaluation 52548  [] PT Re-evaluation 08764  [] Gait training 03478 - minutes  [] Manual therapy 36422 - minutes  [x] Therapeutic activities 29849 10 minutes  [] Therapeutic exercises 38820 - minutes  [] Neuromuscular reeducation 99865 - minutes     Leanna , PTA  [id number]

## 2021-05-25 NOTE — PROGRESS NOTES
Waldo Burgess is a 50 y.o. right handed female     Neurology is following for stroke-like symptoms    PMH: Migraines, smoking    Presented with the sudden onset of left-sided weakness and numbness--associated with migraine headache. NIH of 6 and imaging all negative. Received TPA with mild improvement. Functional components on exam.  Significant amount of stress and going through divorce. MRI of the brain showed no evidence of acute stroke. She has been mildly hypotensive with SBP's in the 80s and continues to note weakness in her left arm and leg as well as new stabbing pains in her left foot. She denies any light headedness or dizziness. She denies any neck or back pains or radiating pains. She is upset that there is \"no answer\" to her symptoms and her sister is at the bedside also upset. She continues to have a headache this morning but not as severe as yesterday. --She now tells me this is \"nothing like\" her typical migraines.     Current Facility-Administered Medications   Medication Dose Route Frequency Provider Last Rate Last Admin    aspirin chewable tablet 81 mg  81 mg Oral Daily MERCY Hill - CNP   81 mg at 05/25/21 0853    oxyCODONE (ROXICODONE) immediate release tablet 5 mg  5 mg Oral Q4H PRN Mi Julian MD   5 mg at 05/25/21 0023    acetaminophen (TYLENOL) tablet 650 mg  650 mg Oral Q4H PRN Mi Julian MD   650 mg at 05/23/21 0130    sodium chloride flush 0.9 % injection 5-40 mL  5-40 mL Intravenous 2 times per day Douglas Dowd MD   10 mL at 05/25/21 0855    sodium chloride flush 0.9 % injection 5-40 mL  5-40 mL Intravenous PRN Douglas Dowd MD        0.9 % sodium chloride infusion  25 mL Intravenous PRN Douglas Dowd MD        [Held by provider] enoxaparin (LOVENOX) injection 40 mg  40 mg Subcutaneous Daily Douglas Dowd MD        promethazine (PHENERGAN) tablet 12.5 mg  12.5 mg Oral Q6H PRN Douglas Dowd MD        Or    ondansetron [facility]) injection 4 mg  4  05/25/2021    MCV 89.6 05/25/2021    MCH 28.3 05/25/2021    MCHC 31.6 05/25/2021    RDW 13.2 05/25/2021    LYMPHOPCT 21.8 05/22/2021    MONOPCT 7.2 05/22/2021    BASOPCT 0.6 05/22/2021    MONOSABS 0.62 05/22/2021    LYMPHSABS 1.87 05/22/2021    EOSABS 0.07 05/22/2021    BASOSABS 0.05 05/22/2021     CMP:    Lab Results   Component Value Date     05/25/2021    K 4.2 05/25/2021    K 3.7 06/09/2019     05/25/2021    CO2 27 05/25/2021    BUN 19 05/25/2021    CREATININE 0.9 05/25/2021    GFRAA >60 05/25/2021    LABGLOM >60 05/25/2021    GLUCOSE 93 05/25/2021    PROT 7.6 05/22/2021    LABALBU 4.4 05/22/2021    CALCIUM 8.9 05/25/2021    BILITOT 0.6 05/22/2021    ALKPHOS 87 05/22/2021    AST 20 05/22/2021    ALT 16 05/22/2021     CT the head: Unremarkable    CTAs head and neck and CTP: Unremarkable    MRI brain: No evidence of acute stroke; a few areas of small vessel changes    All labs and images personally reviewed today    Assessment:     Complex migraine vs functional disorder: With the persistence of her symptoms, would expect ischemic changes to be seen on MRI if this was acute stroke, but imaging negative. If this was stroke aborted by tPA, her symptoms would be resolved entirely. Occult ischemia is possible, but doubtful, as her exam findings are somewhat functional.  No evidence to suggest cervical or lumbosacral issues at this time.     Plan:     No need for echo from neuro standpoint unless otherwise indicated    Could repeat MRI of the brain in the future if issues persist    Needs better treatment for migraines as outpatient    No harm in continuing ASA 81 mg at discharge    Discussed with her sister--- and all questions answered at length    Neuro signing off--call with new issues    Follow-up with me in the office in 2 weeks    MERCY Jauregui - CNP  10:20 AM  5/25/2021

## 2021-06-12 NOTE — DISCHARGE SUMMARY
Physician Discharge Summary     Patient ID:  Sylvia Hernandez  [id number]  50 y.o.  1965    Admit date: 5/22/2021    Discharge date and time: 5/25/2021  2:19 PM     Admission Diagnoses: Active Problems:    Stroke-like symptoms    Migraine    Received intravenous tissue plasminogen activator (tPA) in emergency department  Resolved Problems:    * No resolved hospital problems. *      Discharge Diagnoses: Active Problems:    Stroke-like symptoms    Migraine    Received intravenous tissue plasminogen activator (tPA) in emergency department  Resolved Problems:    * No resolved hospital problems. *      Condition at discharge : Stable    Consults: IP CONSULT TO NEUROLOGY  IP CONSULT TO PHARMACY  PHARMACY TO CHANGE BASE FLUIDS  IP CONSULT TO CRITICAL CARE  IP CONSULT TO INTERNAL MEDICINE    Procedures: [facility] SP Course: 53-year-old lady presents to the emergency room with left-sided weakness. Telemetry neurology was consulted. She was considered to be a TPA candidate and was administered TPA. She was then admitted to neurosurgical ICU. Neurology felt that her episode was most consistent with functional symptoms. MRI negative for acute stroke. Patient was then discharged home with outpatient follow-up. She was also recommended possible tertiary care evaluation to evaluate her symptoms. Hospital course was discussed with her primary care physician in detail before discharge. MRI BRAIN WO CONTRAST   Final Result   Chronic microvascular disease without acute intracranial abnormality. Minimal fluid in the left mastoid air cells. CT head without contrast   Final Result   No acute intracranial abnormality. XR CHEST PORTABLE   Final Result   No acute process. CTA HEAD W CONTRAST   Final Result   1. Mild atherosclerotic disease . No large vessel occlusion   identified   2.  Estimated stenosis of the proximal right and left internal carotid   artery by NASCET criteria is not hemodynamically significant         This study was analyzed by the [address] ai algorithm. CTA NECK W CONTRAST   Final Result   1. Mild atherosclerotic disease . No large vessel occlusion   identified   2. Estimated stenosis of the proximal right and left internal carotid   artery by NASCET criteria is not hemodynamically significant         This study was analyzed by the [employer] ai algorithm. CT BRAIN PERFUSION   Final Result      No significant ischemic penumbra identified      This study was analyzed by the [employer] ai algorithm. CT HEAD WO CONTRAST   Final Result   No acute intracranial abnormality. Results were called by Dr. Debra Justice to Jacqueline Montero on 5/22/2021 at   15:43. No results found for this or any previous visit (from the past 336 hour(s)). Discharge Exam:  See progress note from today    Disposition: Home    Patient Instructions:   Discharge Medication List as of 5/25/2021 12:01 PM      START taking these medications    Details   aspirin 81 MG chewable tablet Take 1 tablet by mouth daily, Disp-30 tablet, R-3Normal      rosuvastatin (CRESTOR) 40 MG tablet Take 1 tablet by mouth nightly, Disp-30 tablet, R-3Normal         CONTINUE these medications which have NOT CHANGED    Details   Rizatriptan Benzoate (MAXALT PO) Take  by mouth as needed. Historical Med         STOP taking these medications       ibuprofen (ADVIL;MOTRIN) 800 MG tablet Comments:   Reason for Stopping:         HYDROcodone-acetaminophen ([address]) 5-325 MG per tablet Comments:   Reason for Stopping:               Activity: As tolerated    Diet: Cardiac    Follow-up with Pedro Ordaz, MERCY - CNP  [address] [place] [address] [address] [id number]  [phone]    In 2 weeks          Note that over 30 minutes was spent in preparing discharge papers, discussing discharge with patient, medication review, etc.    Signed:  Melina Ruiz MD  6/12/2021  7:03 PM

## 2023-07-04 ENCOUNTER — APPOINTMENT (OUTPATIENT)
Dept: GENERAL RADIOLOGY | Age: 58
DRG: 263 | End: 2023-07-04
Payer: COMMERCIAL

## 2023-07-04 ENCOUNTER — APPOINTMENT (OUTPATIENT)
Dept: CT IMAGING | Age: 58
DRG: 263 | End: 2023-07-04
Payer: COMMERCIAL

## 2023-07-04 LAB
ALBUMIN SERPL-MCNC: 4.2 G/DL (ref 3.5–5.2)
ALP SERPL-CCNC: 118 U/L (ref 35–104)
ALT SERPL-CCNC: 44 U/L (ref 0–32)
ANION GAP SERPL CALCULATED.3IONS-SCNC: 14 MMOL/L (ref 7–16)
AST SERPL-CCNC: 91 U/L (ref 0–31)
BASOPHILS # BLD: 0.04 E9/L (ref 0–0.2)
BASOPHILS NFR BLD: 0.4 % (ref 0–2)
BILIRUB SERPL-MCNC: 0.5 MG/DL (ref 0–1.2)
BUN SERPL-MCNC: 17 MG/DL (ref 6–20)
CALCIUM SERPL-MCNC: 9.8 MG/DL (ref 8.6–10.2)
CHLORIDE SERPL-SCNC: 104 MMOL/L (ref 98–107)
CO2 SERPL-SCNC: 22 MMOL/L (ref 22–29)
CREAT SERPL-MCNC: 0.9 MG/DL (ref 0.5–1)
D DIMER: <200 NG/ML DDU
EOSINOPHIL # BLD: 0.06 E9/L (ref 0.05–0.5)
EOSINOPHIL NFR BLD: 0.6 % (ref 0–6)
ERYTHROCYTE [DISTWIDTH] IN BLOOD BY AUTOMATED COUNT: 13.5 FL (ref 11.5–15)
GLUCOSE SERPL-MCNC: 106 MG/DL (ref 74–99)
HCT VFR BLD AUTO: 41.1 % (ref 34–48)
HGB BLD-MCNC: 13.2 G/DL (ref 11.5–15.5)
IMM GRANULOCYTES # BLD: 0.04 E9/L
IMM GRANULOCYTES NFR BLD: 0.4 % (ref 0–5)
LIPASE: 43 U/L (ref 13–60)
LYMPHOCYTES # BLD: 2.63 E9/L (ref 1.5–4)
LYMPHOCYTES NFR BLD: 26.2 % (ref 20–42)
MAGNESIUM SERPL-MCNC: 2.1 MG/DL (ref 1.6–2.6)
MCH RBC QN AUTO: 28.4 PG (ref 26–35)
MCHC RBC AUTO-ENTMCNC: 32.1 % (ref 32–34.5)
MCV RBC AUTO: 88.6 FL (ref 80–99.9)
MONOCYTES # BLD: 0.73 E9/L (ref 0.1–0.95)
MONOCYTES NFR BLD: 7.3 % (ref 2–12)
NEUTROPHILS # BLD: 6.52 E9/L (ref 1.8–7.3)
NEUTS SEG NFR BLD: 65.1 % (ref 43–80)
PLATELET # BLD AUTO: 288 E9/L (ref 130–450)
PMV BLD AUTO: 11.4 FL (ref 7–12)
POTASSIUM SERPL-SCNC: 3.9 MMOL/L (ref 3.5–5)
PROT SERPL-MCNC: 7.6 G/DL (ref 6.4–8.3)
RBC # BLD AUTO: 4.64 E12/L (ref 3.5–5.5)
SODIUM SERPL-SCNC: 140 MMOL/L (ref 132–146)
TROPONIN, HIGH SENSITIVITY: 6 NG/L (ref 0–9)
TROPONIN, HIGH SENSITIVITY: <6 NG/L (ref 0–9)
WBC # BLD: 10 E9/L (ref 4.5–11.5)

## 2023-07-04 PROCEDURE — 85025 COMPLETE CBC W/AUTO DIFF WBC: CPT

## 2023-07-04 PROCEDURE — 2580000003 HC RX 258: Performed by: EMERGENCY MEDICINE

## 2023-07-04 PROCEDURE — 96375 TX/PRO/DX INJ NEW DRUG ADDON: CPT

## 2023-07-04 PROCEDURE — 93005 ELECTROCARDIOGRAM TRACING: CPT | Performed by: EMERGENCY MEDICINE

## 2023-07-04 PROCEDURE — 99285 EMERGENCY DEPT VISIT HI MDM: CPT

## 2023-07-04 PROCEDURE — 71045 X-RAY EXAM CHEST 1 VIEW: CPT

## 2023-07-04 PROCEDURE — 94664 DEMO&/EVAL PT USE INHALER: CPT

## 2023-07-04 PROCEDURE — 80053 COMPREHEN METABOLIC PANEL: CPT

## 2023-07-04 PROCEDURE — 6360000002 HC RX W HCPCS: Performed by: EMERGENCY MEDICINE

## 2023-07-04 PROCEDURE — 96361 HYDRATE IV INFUSION ADD-ON: CPT

## 2023-07-04 PROCEDURE — 6360000004 HC RX CONTRAST MEDICATION: Performed by: RADIOLOGY

## 2023-07-04 PROCEDURE — 85378 FIBRIN DEGRADE SEMIQUANT: CPT

## 2023-07-04 PROCEDURE — 84484 ASSAY OF TROPONIN QUANT: CPT

## 2023-07-04 PROCEDURE — 83690 ASSAY OF LIPASE: CPT

## 2023-07-04 PROCEDURE — 96374 THER/PROPH/DIAG INJ IV PUSH: CPT

## 2023-07-04 PROCEDURE — 83735 ASSAY OF MAGNESIUM: CPT

## 2023-07-04 PROCEDURE — 96376 TX/PRO/DX INJ SAME DRUG ADON: CPT

## 2023-07-04 PROCEDURE — 74177 CT ABD & PELVIS W/CONTRAST: CPT

## 2023-07-04 PROCEDURE — 6370000000 HC RX 637 (ALT 250 FOR IP): Performed by: EMERGENCY MEDICINE

## 2023-07-04 RX ORDER — 0.9 % SODIUM CHLORIDE 0.9 %
1000 INTRAVENOUS SOLUTION INTRAVENOUS ONCE
Status: COMPLETED | OUTPATIENT
Start: 2023-07-04 | End: 2023-07-05

## 2023-07-04 RX ORDER — MORPHINE SULFATE 4 MG/ML
4 INJECTION, SOLUTION INTRAMUSCULAR; INTRAVENOUS ONCE
Status: COMPLETED | OUTPATIENT
Start: 2023-07-04 | End: 2023-07-04

## 2023-07-04 RX ORDER — IPRATROPIUM BROMIDE AND ALBUTEROL SULFATE 2.5; .5 MG/3ML; MG/3ML
1 SOLUTION RESPIRATORY (INHALATION) ONCE
Status: COMPLETED | OUTPATIENT
Start: 2023-07-04 | End: 2023-07-04

## 2023-07-04 RX ORDER — LORAZEPAM 2 MG/ML
0.5 INJECTION INTRAMUSCULAR ONCE
Status: COMPLETED | OUTPATIENT
Start: 2023-07-04 | End: 2023-07-04

## 2023-07-04 RX ADMIN — IPRATROPIUM BROMIDE AND ALBUTEROL SULFATE 1 DOSE: 2.5; .5 SOLUTION RESPIRATORY (INHALATION) at 21:00

## 2023-07-04 RX ADMIN — SODIUM CHLORIDE 1000 ML: 9 INJECTION, SOLUTION INTRAVENOUS at 22:05

## 2023-07-04 RX ADMIN — IOPAMIDOL 75 ML: 755 INJECTION, SOLUTION INTRAVENOUS at 23:51

## 2023-07-04 RX ADMIN — LORAZEPAM 0.5 MG: 2 INJECTION INTRAMUSCULAR; INTRAVENOUS at 21:01

## 2023-07-04 RX ADMIN — MORPHINE SULFATE 4 MG: 4 INJECTION, SOLUTION INTRAMUSCULAR; INTRAVENOUS at 20:59

## 2023-07-04 ASSESSMENT — PAIN SCALES - GENERAL
PAINLEVEL_OUTOF10: 7
PAINLEVEL_OUTOF10: 8

## 2023-07-04 ASSESSMENT — PAIN - FUNCTIONAL ASSESSMENT: PAIN_FUNCTIONAL_ASSESSMENT: 0-10

## 2023-07-04 ASSESSMENT — PAIN DESCRIPTION - LOCATION: LOCATION: CHEST

## 2023-07-05 ENCOUNTER — HOSPITAL ENCOUNTER (INPATIENT)
Age: 58
LOS: 2 days | Discharge: HOME | DRG: 263 | End: 2023-07-07
Attending: EMERGENCY MEDICINE | Admitting: SURGERY
Payer: COMMERCIAL

## 2023-07-05 ENCOUNTER — APPOINTMENT (OUTPATIENT)
Dept: ULTRASOUND IMAGING | Age: 58
DRG: 263 | End: 2023-07-05
Payer: COMMERCIAL

## 2023-07-05 DIAGNOSIS — R29.90 STROKE-LIKE SYMPTOMS: ICD-10-CM

## 2023-07-05 DIAGNOSIS — M25.551 RIGHT HIP PAIN: ICD-10-CM

## 2023-07-05 DIAGNOSIS — K81.0 ACUTE CHOLECYSTITIS: Primary | ICD-10-CM

## 2023-07-05 DIAGNOSIS — K81.9 CHOLECYSTITIS: ICD-10-CM

## 2023-07-05 DIAGNOSIS — Z92.82 RECEIVED INTRAVENOUS TISSUE PLASMINOGEN ACTIVATOR (TPA) IN EMERGENCY DEPARTMENT: ICD-10-CM

## 2023-07-05 LAB
ALBUMIN SERPL-MCNC: 3.9 G/DL (ref 3.5–5.2)
ALP SERPL-CCNC: 152 U/L (ref 35–104)
ALT SERPL-CCNC: 258 U/L (ref 0–32)
ANION GAP SERPL CALCULATED.3IONS-SCNC: 8 MMOL/L (ref 7–16)
AST SERPL-CCNC: 433 U/L (ref 0–31)
BILIRUB SERPL-MCNC: 1.1 MG/DL (ref 0–1.2)
BUN SERPL-MCNC: 18 MG/DL (ref 6–20)
CALCIUM SERPL-MCNC: 8.8 MG/DL (ref 8.6–10.2)
CHLORIDE SERPL-SCNC: 108 MMOL/L (ref 98–107)
CO2 SERPL-SCNC: 26 MMOL/L (ref 22–29)
CREAT SERPL-MCNC: 0.8 MG/DL (ref 0.5–1)
EKG ATRIAL RATE: 88 BPM
EKG P AXIS: 50 DEGREES
EKG P-R INTERVAL: 172 MS
EKG Q-T INTERVAL: 378 MS
EKG QRS DURATION: 92 MS
EKG QTC CALCULATION (BAZETT): 457 MS
EKG R AXIS: -9 DEGREES
EKG T AXIS: 31 DEGREES
EKG VENTRICULAR RATE: 88 BPM
GLUCOSE SERPL-MCNC: 88 MG/DL (ref 74–99)
POTASSIUM SERPL-SCNC: 4.1 MMOL/L (ref 3.5–5)
PROT SERPL-MCNC: 6.6 G/DL (ref 6.4–8.3)
SODIUM SERPL-SCNC: 142 MMOL/L (ref 132–146)

## 2023-07-05 PROCEDURE — 93010 ELECTROCARDIOGRAM REPORT: CPT | Performed by: INTERNAL MEDICINE

## 2023-07-05 PROCEDURE — 6360000002 HC RX W HCPCS: Performed by: STUDENT IN AN ORGANIZED HEALTH CARE EDUCATION/TRAINING PROGRAM

## 2023-07-05 PROCEDURE — A4216 STERILE WATER/SALINE, 10 ML: HCPCS | Performed by: STUDENT IN AN ORGANIZED HEALTH CARE EDUCATION/TRAINING PROGRAM

## 2023-07-05 PROCEDURE — 1200000000 HC SEMI PRIVATE

## 2023-07-05 PROCEDURE — 2580000003 HC RX 258: Performed by: STUDENT IN AN ORGANIZED HEALTH CARE EDUCATION/TRAINING PROGRAM

## 2023-07-05 PROCEDURE — 80053 COMPREHEN METABOLIC PANEL: CPT

## 2023-07-05 PROCEDURE — C9113 INJ PANTOPRAZOLE SODIUM, VIA: HCPCS | Performed by: STUDENT IN AN ORGANIZED HEALTH CARE EDUCATION/TRAINING PROGRAM

## 2023-07-05 PROCEDURE — 6370000000 HC RX 637 (ALT 250 FOR IP): Performed by: STUDENT IN AN ORGANIZED HEALTH CARE EDUCATION/TRAINING PROGRAM

## 2023-07-05 PROCEDURE — 6370000000 HC RX 637 (ALT 250 FOR IP)

## 2023-07-05 PROCEDURE — 2580000003 HC RX 258: Performed by: EMERGENCY MEDICINE

## 2023-07-05 PROCEDURE — 6360000002 HC RX W HCPCS: Performed by: EMERGENCY MEDICINE

## 2023-07-05 PROCEDURE — 76705 ECHO EXAM OF ABDOMEN: CPT

## 2023-07-05 PROCEDURE — 99223 1ST HOSP IP/OBS HIGH 75: CPT | Performed by: SURGERY

## 2023-07-05 RX ORDER — OXYCODONE HYDROCHLORIDE 5 MG/1
5 TABLET ORAL EVERY 4 HOURS PRN
Status: DISCONTINUED | OUTPATIENT
Start: 2023-07-05 | End: 2023-07-07 | Stop reason: HOSPADM

## 2023-07-05 RX ORDER — SUMATRIPTAN 50 MG/1
100 TABLET, FILM COATED ORAL ONCE
Status: CANCELLED | OUTPATIENT
Start: 2023-07-05 | End: 2023-07-05

## 2023-07-05 RX ORDER — CALCIUM CARBONATE 500 MG/1
500 TABLET, CHEWABLE ORAL 3 TIMES DAILY PRN
Status: DISCONTINUED | OUTPATIENT
Start: 2023-07-05 | End: 2023-07-07 | Stop reason: HOSPADM

## 2023-07-05 RX ORDER — METRONIDAZOLE 500 MG/100ML
500 INJECTION, SOLUTION INTRAVENOUS EVERY 8 HOURS
Status: DISCONTINUED | OUTPATIENT
Start: 2023-07-05 | End: 2023-07-07 | Stop reason: HOSPADM

## 2023-07-05 RX ORDER — METRONIDAZOLE 500 MG/100ML
500 INJECTION, SOLUTION INTRAVENOUS ONCE
Status: COMPLETED | OUTPATIENT
Start: 2023-07-05 | End: 2023-07-05

## 2023-07-05 RX ORDER — ONDANSETRON 2 MG/ML
4 INJECTION INTRAMUSCULAR; INTRAVENOUS ONCE
Status: COMPLETED | OUTPATIENT
Start: 2023-07-05 | End: 2023-07-05

## 2023-07-05 RX ORDER — SODIUM CHLORIDE 0.9 % (FLUSH) 0.9 %
5-40 SYRINGE (ML) INJECTION PRN
Status: DISCONTINUED | OUTPATIENT
Start: 2023-07-05 | End: 2023-07-07 | Stop reason: HOSPADM

## 2023-07-05 RX ORDER — ACETAMINOPHEN 325 MG/1
650 TABLET ORAL EVERY 4 HOURS PRN
Status: DISCONTINUED | OUTPATIENT
Start: 2023-07-05 | End: 2023-07-07 | Stop reason: HOSPADM

## 2023-07-05 RX ORDER — SUMATRIPTAN 50 MG/1
100 TABLET, FILM COATED ORAL PRN
Status: DISCONTINUED | OUTPATIENT
Start: 2023-07-05 | End: 2023-07-07 | Stop reason: HOSPADM

## 2023-07-05 RX ORDER — ONDANSETRON 2 MG/ML
4 INJECTION INTRAMUSCULAR; INTRAVENOUS EVERY 6 HOURS PRN
Status: DISCONTINUED | OUTPATIENT
Start: 2023-07-05 | End: 2023-07-05

## 2023-07-05 RX ORDER — IBUPROFEN 800 MG/1
800 TABLET ORAL EVERY 8 HOURS PRN
COMMUNITY

## 2023-07-05 RX ORDER — MORPHINE SULFATE 4 MG/ML
4 INJECTION, SOLUTION INTRAMUSCULAR; INTRAVENOUS ONCE
Status: COMPLETED | OUTPATIENT
Start: 2023-07-05 | End: 2023-07-05

## 2023-07-05 RX ORDER — ESCITALOPRAM OXALATE 20 MG/1
20 TABLET ORAL DAILY
COMMUNITY

## 2023-07-05 RX ORDER — ENOXAPARIN SODIUM 100 MG/ML
40 INJECTION SUBCUTANEOUS DAILY
Status: DISCONTINUED | OUTPATIENT
Start: 2023-07-05 | End: 2023-07-07 | Stop reason: HOSPADM

## 2023-07-05 RX ORDER — ONDANSETRON 4 MG/1
4 TABLET, ORALLY DISINTEGRATING ORAL EVERY 8 HOURS PRN
Status: DISCONTINUED | OUTPATIENT
Start: 2023-07-05 | End: 2023-07-07 | Stop reason: HOSPADM

## 2023-07-05 RX ORDER — RIZATRIPTAN BENZOATE 10 MG/1
10 TABLET, ORALLY DISINTEGRATING ORAL
COMMUNITY

## 2023-07-05 RX ORDER — SODIUM CHLORIDE 0.9 % (FLUSH) 0.9 %
5-40 SYRINGE (ML) INJECTION EVERY 12 HOURS SCHEDULED
Status: DISCONTINUED | OUTPATIENT
Start: 2023-07-05 | End: 2023-07-07 | Stop reason: HOSPADM

## 2023-07-05 RX ORDER — SODIUM CHLORIDE, SODIUM LACTATE, POTASSIUM CHLORIDE, CALCIUM CHLORIDE 600; 310; 30; 20 MG/100ML; MG/100ML; MG/100ML; MG/100ML
INJECTION, SOLUTION INTRAVENOUS CONTINUOUS
Status: DISCONTINUED | OUTPATIENT
Start: 2023-07-05 | End: 2023-07-07 | Stop reason: HOSPADM

## 2023-07-05 RX ORDER — MORPHINE SULFATE 2 MG/ML
2 INJECTION, SOLUTION INTRAMUSCULAR; INTRAVENOUS
Status: DISCONTINUED | OUTPATIENT
Start: 2023-07-05 | End: 2023-07-06

## 2023-07-05 RX ORDER — SODIUM CHLORIDE 9 MG/ML
INJECTION, SOLUTION INTRAVENOUS PRN
Status: DISCONTINUED | OUTPATIENT
Start: 2023-07-05 | End: 2023-07-07 | Stop reason: HOSPADM

## 2023-07-05 RX ORDER — ONDANSETRON 2 MG/ML
4 INJECTION INTRAMUSCULAR; INTRAVENOUS EVERY 6 HOURS PRN
Status: DISCONTINUED | OUTPATIENT
Start: 2023-07-05 | End: 2023-07-07 | Stop reason: HOSPADM

## 2023-07-05 RX ORDER — OXYBUTYNIN CHLORIDE 10 MG/1
10 TABLET, EXTENDED RELEASE ORAL DAILY
COMMUNITY

## 2023-07-05 RX ORDER — MORPHINE SULFATE 4 MG/ML
4 INJECTION, SOLUTION INTRAMUSCULAR; INTRAVENOUS
Status: DISCONTINUED | OUTPATIENT
Start: 2023-07-05 | End: 2023-07-06

## 2023-07-05 RX ORDER — PANTOPRAZOLE SODIUM 40 MG/1
40 TABLET, DELAYED RELEASE ORAL DAILY
COMMUNITY

## 2023-07-05 RX ADMIN — SODIUM CHLORIDE, PRESERVATIVE FREE 10 ML: 5 INJECTION INTRAVENOUS at 12:07

## 2023-07-05 RX ADMIN — ENOXAPARIN SODIUM 40 MG: 100 INJECTION SUBCUTANEOUS at 11:50

## 2023-07-05 RX ADMIN — SODIUM CHLORIDE, POTASSIUM CHLORIDE, SODIUM LACTATE AND CALCIUM CHLORIDE: 600; 310; 30; 20 INJECTION, SOLUTION INTRAVENOUS at 14:01

## 2023-07-05 RX ADMIN — SUMATRIPTAN SUCCINATE 100 MG: 50 TABLET ORAL at 14:02

## 2023-07-05 RX ADMIN — SODIUM CHLORIDE, PRESERVATIVE FREE 10 ML: 5 INJECTION INTRAVENOUS at 13:27

## 2023-07-05 RX ADMIN — METRONIDAZOLE 500 MG: 500 INJECTION, SOLUTION INTRAVENOUS at 11:59

## 2023-07-05 RX ADMIN — METRONIDAZOLE 500 MG: 500 INJECTION, SOLUTION INTRAVENOUS at 02:34

## 2023-07-05 RX ADMIN — WATER 1000 MG: 1 INJECTION INTRAMUSCULAR; INTRAVENOUS; SUBCUTANEOUS at 02:12

## 2023-07-05 RX ADMIN — ONDANSETRON 4 MG: 2 INJECTION INTRAMUSCULAR; INTRAVENOUS at 06:41

## 2023-07-05 RX ADMIN — SODIUM CHLORIDE, POTASSIUM CHLORIDE, SODIUM LACTATE AND CALCIUM CHLORIDE: 600; 310; 30; 20 INJECTION, SOLUTION INTRAVENOUS at 22:52

## 2023-07-05 RX ADMIN — ONDANSETRON 4 MG: 2 INJECTION INTRAMUSCULAR; INTRAVENOUS at 20:21

## 2023-07-05 RX ADMIN — SODIUM CHLORIDE, POTASSIUM CHLORIDE, SODIUM LACTATE AND CALCIUM CHLORIDE: 600; 310; 30; 20 INJECTION, SOLUTION INTRAVENOUS at 03:38

## 2023-07-05 RX ADMIN — MORPHINE SULFATE 4 MG: 4 INJECTION, SOLUTION INTRAMUSCULAR; INTRAVENOUS at 02:26

## 2023-07-05 RX ADMIN — CEFTRIAXONE SODIUM 2000 MG: 2 INJECTION, POWDER, FOR SOLUTION INTRAMUSCULAR; INTRAVENOUS at 20:14

## 2023-07-05 RX ADMIN — SODIUM CHLORIDE, PRESERVATIVE FREE 10 ML: 5 INJECTION INTRAVENOUS at 20:17

## 2023-07-05 RX ADMIN — METRONIDAZOLE 500 MG: 500 INJECTION, SOLUTION INTRAVENOUS at 20:17

## 2023-07-05 RX ADMIN — ONDANSETRON 4 MG: 2 INJECTION INTRAMUSCULAR; INTRAVENOUS at 13:25

## 2023-07-05 RX ADMIN — SODIUM CHLORIDE 40 MG: 9 INJECTION INTRAMUSCULAR; INTRAVENOUS; SUBCUTANEOUS at 12:07

## 2023-07-05 RX ADMIN — OXYCODONE HYDROCHLORIDE 5 MG: 5 TABLET ORAL at 09:03

## 2023-07-05 RX ADMIN — ONDANSETRON 4 MG: 2 INJECTION INTRAMUSCULAR; INTRAVENOUS at 02:11

## 2023-07-05 ASSESSMENT — PAIN DESCRIPTION - LOCATION
LOCATION: ABDOMEN
LOCATION: ABDOMEN
LOCATION: ABDOMEN;HEAD
LOCATION: ABDOMEN

## 2023-07-05 ASSESSMENT — PAIN - FUNCTIONAL ASSESSMENT: PAIN_FUNCTIONAL_ASSESSMENT: PREVENTS OR INTERFERES SOME ACTIVE ACTIVITIES AND ADLS

## 2023-07-05 ASSESSMENT — PAIN SCALES - GENERAL
PAINLEVEL_OUTOF10: 9
PAINLEVEL_OUTOF10: 7

## 2023-07-05 ASSESSMENT — PAIN DESCRIPTION - ORIENTATION
ORIENTATION: MID;UPPER
ORIENTATION: MID;UPPER

## 2023-07-05 ASSESSMENT — PAIN DESCRIPTION - DESCRIPTORS: DESCRIPTORS: SHARP

## 2023-07-05 NOTE — CARE COORDINATION
7/5.  Met with the pt at the bedside to discuss transition of care. The pt lives alone and is independent. She will return home when medically stable. Her PCP is Dr Cade Leon. Her daughter will be able to provide transportation and stay with her when she is discharge. Will follow.  Orlando Rogers RN

## 2023-07-05 NOTE — H&P
No Known Allergies    Family History   Problem Relation Age of Onset    High Blood Pressure Mother     Stroke Father     Bleeding Prob Father        Social History     Tobacco Use    Smoking status: Some Days     Packs/day: 1.00     Years: 20.00     Pack years: 20.00     Types: Cigarettes    Smokeless tobacco: Never    Tobacco comments:     not right now   Substance Use Topics    Alcohol use: No     Comment: social    Drug use: No         Review of Systems: pertinent ROS listed in HPI, all others negative       PHYSICAL EXAM:    Vitals:    07/05/23 0315   BP: (!) 100/59   Pulse: 73   Resp: 16   Temp:    SpO2: 96%       GENERAL:  NAD. A&Ox3. HEAD:  Normocephalic, atraumatic. EYES:   No scleral icterus. PERRLA. LUNGS:  No increased work of breathing. CARDIOVASCULAR: RR  ABDOMEN:  Soft, non-distended. Epigastric tenderness. RUQ TTP. ASSESSMENT/PLAN:  56 y.o. female with epigastric pain and RUQ pain likely 2/2 acute cholecystitis vs PUD     - admit to med surg  - keep NPO, mIVF  - continue Abx   - protonix  - [address] will be discussed with Dr. Jass Waldron.     Dexter Zendejas MD  Surgery Resident PGY-2  7/5/2023  4:43 AM      Attending Attestation   I saw and examined the patient, I agree with resident note      Hx taken from patient    I personally reviewed the CT abd pelvis with some pericholecystic fluid     RUQ pain elevated LFTs and CT findings consistent with acute cholecystitis, US findings are noted but I think discordant with all other findings,      I am managing prescription drugs, IV abx, rocephin flagyl PRN IV morphine     Prema Maurer MD FACS

## 2023-07-05 NOTE — PROGRESS NOTES
Noted that we have had probs w this pt's bedside computer logging up onto totally different patients. Happened again today at approx 11:15a. Pt's last name may have began w cristobal RUIZ    Logged out of that person's chart as soon as I saw the different name.

## 2023-07-05 NOTE — PROGRESS NOTES
4 Eyes Skin Assessment     NAME:  Trey Hernandez  YOB: 1965  MEDICAL RECORD NUMBER:  [id number]    The patient is being assessed for  Admission    I agree that at least one RN has performed a thorough Head to Toe Skin Assessment on the patient. ALL assessment sites listed below have been assessed. Areas assessed by both nurses:    Head, Face, Ears, Shoulders, Back, Chest, Arms, Elbows, Hands, Sacrum. Buttock, Coccyx, Ischium, and Legs. Feet and Heels        Does the Patient have a Wound?  No noted wound(s)       Abiel Prevention initiated by RN: Yes  Wound Care Orders initiated by RN: No    Pressure Injury (Stage 3,4, Unstageable, DTI, NWPT, and Complex wounds) if present, place Wound referral order by RN under : No    New Ostomies, if present place, Ostomy referral order under : No     Nurse 1 eSignature: Electronically signed by Kevin Lynn RN on 7/5/23 at 4:33 PM EDT    **SHARE this note so that the co-signing nurse can place an eSignature**    Nurse 2 eSignature: Electronically signed by Cara Robles RN on 7/5/23 at 9:47 PM EDT

## 2023-07-05 NOTE — ED NOTES
Transport came for patient [address] , [facility] states patient requests pain meds.      Cuauhtemoc Akhtar RN  07/05/23 0900

## 2023-07-06 ENCOUNTER — ANESTHESIA EVENT (OUTPATIENT)
Dept: OPERATING ROOM | Age: 58
End: 2023-07-06
Payer: COMMERCIAL

## 2023-07-06 ENCOUNTER — ANESTHESIA (OUTPATIENT)
Dept: OPERATING ROOM | Age: 58
End: 2023-07-06
Payer: COMMERCIAL

## 2023-07-06 LAB
ABO + RH BLD: NORMAL
ALBUMIN SERPL-MCNC: 3.5 G/DL (ref 3.5–5.2)
ALP SERPL-CCNC: 165 U/L (ref 35–104)
ALT SERPL-CCNC: 270 U/L (ref 0–32)
ANION GAP SERPL CALCULATED.3IONS-SCNC: 12 MMOL/L (ref 7–16)
AST SERPL-CCNC: 190 U/L (ref 0–31)
BASOPHILS # BLD: 0.04 E9/L (ref 0–0.2)
BASOPHILS NFR BLD: 0.6 % (ref 0–2)
BILIRUB SERPL-MCNC: 0.7 MG/DL (ref 0–1.2)
BLD GP AB SCN SERPL QL: NORMAL
BUN SERPL-MCNC: 12 MG/DL (ref 6–20)
CALCIUM SERPL-MCNC: 8.9 MG/DL (ref 8.6–10.2)
CHLORIDE SERPL-SCNC: 106 MMOL/L (ref 98–107)
CO2 SERPL-SCNC: 23 MMOL/L (ref 22–29)
CREAT SERPL-MCNC: 0.8 MG/DL (ref 0.5–1)
EKG ATRIAL RATE: 92 BPM
EKG P AXIS: 52 DEGREES
EKG P-R INTERVAL: 140 MS
EKG Q-T INTERVAL: 394 MS
EKG QRS DURATION: 88 MS
EKG QTC CALCULATION (BAZETT): 487 MS
EKG R AXIS: 21 DEGREES
EKG T AXIS: 63 DEGREES
EKG VENTRICULAR RATE: 92 BPM
EOSINOPHIL # BLD: 0.1 E9/L (ref 0.05–0.5)
EOSINOPHIL NFR BLD: 1.4 % (ref 0–6)
ERYTHROCYTE [DISTWIDTH] IN BLOOD BY AUTOMATED COUNT: 13.3 FL (ref 11.5–15)
GLUCOSE SERPL-MCNC: 74 MG/DL (ref 74–99)
HCT VFR BLD AUTO: 40.2 % (ref 34–48)
HGB BLD-MCNC: 12.2 G/DL (ref 11.5–15.5)
IMM GRANULOCYTES # BLD: 0.02 E9/L
IMM GRANULOCYTES NFR BLD: 0.3 % (ref 0–5)
INR BLD: 1.1
LIPASE: 32 U/L (ref 13–60)
LYMPHOCYTES # BLD: 1.54 E9/L (ref 1.5–4)
LYMPHOCYTES NFR BLD: 21.7 % (ref 20–42)
MCH RBC QN AUTO: 28.1 PG (ref 26–35)
MCHC RBC AUTO-ENTMCNC: 30.3 % (ref 32–34.5)
MCV RBC AUTO: 92.6 FL (ref 80–99.9)
METER GLUCOSE: 107 MG/DL (ref 74–99)
MONOCYTES # BLD: 0.51 E9/L (ref 0.1–0.95)
MONOCYTES NFR BLD: 7.2 % (ref 2–12)
NEUTROPHILS # BLD: 4.88 E9/L (ref 1.8–7.3)
NEUTS SEG NFR BLD: 68.8 % (ref 43–80)
PLATELET # BLD AUTO: 187 E9/L (ref 130–450)
PMV BLD AUTO: 11.7 FL (ref 7–12)
POTASSIUM SERPL-SCNC: 3.7 MMOL/L (ref 3.5–5)
PROT SERPL-MCNC: 6.2 G/DL (ref 6.4–8.3)
PROTHROMBIN TIME: 12.2 SEC (ref 9.3–12.4)
RBC # BLD AUTO: 4.34 E12/L (ref 3.5–5.5)
SODIUM SERPL-SCNC: 141 MMOL/L (ref 132–146)
WBC # BLD: 7.1 E9/L (ref 4.5–11.5)

## 2023-07-06 PROCEDURE — 3700000000 HC ANESTHESIA ATTENDED CARE: Performed by: SURGERY

## 2023-07-06 PROCEDURE — 6370000000 HC RX 637 (ALT 250 FOR IP): Performed by: SURGERY

## 2023-07-06 PROCEDURE — 3600000006 HC SURGERY LEVEL 6 BASE: Performed by: SURGERY

## 2023-07-06 PROCEDURE — 86900 BLOOD TYPING SEROLOGIC ABO: CPT

## 2023-07-06 PROCEDURE — 2709999900 HC NON-CHARGEABLE SUPPLY: Performed by: SURGERY

## 2023-07-06 PROCEDURE — 7100000000 HC PACU RECOVERY - FIRST 15 MIN: Performed by: SURGERY

## 2023-07-06 PROCEDURE — A4216 STERILE WATER/SALINE, 10 ML: HCPCS | Performed by: STUDENT IN AN ORGANIZED HEALTH CARE EDUCATION/TRAINING PROGRAM

## 2023-07-06 PROCEDURE — 80053 COMPREHEN METABOLIC PANEL: CPT

## 2023-07-06 PROCEDURE — 2580000003 HC RX 258: Performed by: SURGERY

## 2023-07-06 PROCEDURE — 3700000001 HC ADD 15 MINUTES (ANESTHESIA): Performed by: SURGERY

## 2023-07-06 PROCEDURE — 85610 PROTHROMBIN TIME: CPT

## 2023-07-06 PROCEDURE — 6360000002 HC RX W HCPCS: Performed by: NURSE ANESTHETIST, CERTIFIED REGISTERED

## 2023-07-06 PROCEDURE — 7100000001 HC PACU RECOVERY - ADDTL 15 MIN: Performed by: SURGERY

## 2023-07-06 PROCEDURE — 88304 TISSUE EXAM BY PATHOLOGIST: CPT

## 2023-07-06 PROCEDURE — C9113 INJ PANTOPRAZOLE SODIUM, VIA: HCPCS | Performed by: STUDENT IN AN ORGANIZED HEALTH CARE EDUCATION/TRAINING PROGRAM

## 2023-07-06 PROCEDURE — 2500000003 HC RX 250 WO HCPCS: Performed by: ANESTHESIOLOGY

## 2023-07-06 PROCEDURE — 86901 BLOOD TYPING SEROLOGIC RH(D): CPT

## 2023-07-06 PROCEDURE — 6360000002 HC RX W HCPCS: Performed by: SURGERY

## 2023-07-06 PROCEDURE — 82962 GLUCOSE BLOOD TEST: CPT

## 2023-07-06 PROCEDURE — 47562 LAPAROSCOPIC CHOLECYSTECTOMY: CPT | Performed by: SURGERY

## 2023-07-06 PROCEDURE — 0FT44ZZ RESECTION OF GALLBLADDER, PERCUTANEOUS ENDOSCOPIC APPROACH: ICD-10-PCS | Performed by: SURGERY

## 2023-07-06 PROCEDURE — 2580000003 HC RX 258: Performed by: STUDENT IN AN ORGANIZED HEALTH CARE EDUCATION/TRAINING PROGRAM

## 2023-07-06 PROCEDURE — 36415 COLL VENOUS BLD VENIPUNCTURE: CPT

## 2023-07-06 PROCEDURE — 86850 RBC ANTIBODY SCREEN: CPT

## 2023-07-06 PROCEDURE — 93010 ELECTROCARDIOGRAM REPORT: CPT | Performed by: INTERNAL MEDICINE

## 2023-07-06 PROCEDURE — 83690 ASSAY OF LIPASE: CPT

## 2023-07-06 PROCEDURE — 1200000000 HC SEMI PRIVATE

## 2023-07-06 PROCEDURE — 2580000003 HC RX 258

## 2023-07-06 PROCEDURE — 2500000003 HC RX 250 WO HCPCS: Performed by: SURGERY

## 2023-07-06 PROCEDURE — 6360000002 HC RX W HCPCS: Performed by: STUDENT IN AN ORGANIZED HEALTH CARE EDUCATION/TRAINING PROGRAM

## 2023-07-06 PROCEDURE — 6370000000 HC RX 637 (ALT 250 FOR IP): Performed by: STUDENT IN AN ORGANIZED HEALTH CARE EDUCATION/TRAINING PROGRAM

## 2023-07-06 PROCEDURE — 2500000003 HC RX 250 WO HCPCS: Performed by: NURSE ANESTHETIST, CERTIFIED REGISTERED

## 2023-07-06 PROCEDURE — 3600000016 HC SURGERY LEVEL 6 ADDTL 15MIN: Performed by: SURGERY

## 2023-07-06 PROCEDURE — 85025 COMPLETE CBC W/AUTO DIFF WBC: CPT

## 2023-07-06 RX ORDER — ONDANSETRON 2 MG/ML
4 INJECTION INTRAMUSCULAR; INTRAVENOUS
Status: DISCONTINUED | OUTPATIENT
Start: 2023-07-06 | End: 2023-07-06 | Stop reason: HOSPADM

## 2023-07-06 RX ORDER — SODIUM CHLORIDE 9 MG/ML
INJECTION, SOLUTION INTRAVENOUS CONTINUOUS PRN
Status: DISCONTINUED | OUTPATIENT
Start: 2023-07-06 | End: 2023-07-06 | Stop reason: SDUPTHER

## 2023-07-06 RX ORDER — METRONIDAZOLE 500 MG/100ML
INJECTION, SOLUTION INTRAVENOUS PRN
Status: DISCONTINUED | OUTPATIENT
Start: 2023-07-06 | End: 2023-07-06 | Stop reason: SDUPTHER

## 2023-07-06 RX ORDER — SODIUM CHLORIDE 0.9 % (FLUSH) 0.9 %
5-40 SYRINGE (ML) INJECTION PRN
Status: DISCONTINUED | OUTPATIENT
Start: 2023-07-06 | End: 2023-07-06 | Stop reason: HOSPADM

## 2023-07-06 RX ORDER — HYDROMORPHONE HYDROCHLORIDE 2 MG/1
1 TABLET ORAL EVERY 4 HOURS PRN
Status: DISCONTINUED | OUTPATIENT
Start: 2023-07-06 | End: 2023-07-07 | Stop reason: HOSPADM

## 2023-07-06 RX ORDER — DEXAMETHASONE SODIUM PHOSPHATE 10 MG/ML
INJECTION INTRAMUSCULAR; INTRAVENOUS PRN
Status: DISCONTINUED | OUTPATIENT
Start: 2023-07-06 | End: 2023-07-06 | Stop reason: SDUPTHER

## 2023-07-06 RX ORDER — KETAMINE HCL IN NACL, ISO-OSM 100MG/10ML
SYRINGE (ML) INJECTION PRN
Status: DISCONTINUED | OUTPATIENT
Start: 2023-07-06 | End: 2023-07-06 | Stop reason: SDUPTHER

## 2023-07-06 RX ORDER — HYDROMORPHONE HYDROCHLORIDE 1 MG/ML
0.5 INJECTION, SOLUTION INTRAMUSCULAR; INTRAVENOUS; SUBCUTANEOUS EVERY 5 MIN PRN
Status: DISCONTINUED | OUTPATIENT
Start: 2023-07-06 | End: 2023-07-06 | Stop reason: HOSPADM

## 2023-07-06 RX ORDER — HYDROMORPHONE HYDROCHLORIDE 1 MG/ML
0.25 INJECTION, SOLUTION INTRAMUSCULAR; INTRAVENOUS; SUBCUTANEOUS EVERY 5 MIN PRN
Status: DISCONTINUED | OUTPATIENT
Start: 2023-07-06 | End: 2023-07-06 | Stop reason: HOSPADM

## 2023-07-06 RX ORDER — SODIUM CHLORIDE 0.9 % (FLUSH) 0.9 %
5-40 SYRINGE (ML) INJECTION EVERY 12 HOURS SCHEDULED
Status: DISCONTINUED | OUTPATIENT
Start: 2023-07-06 | End: 2023-07-06 | Stop reason: HOSPADM

## 2023-07-06 RX ORDER — HYDROMORPHONE HYDROCHLORIDE 2 MG/1
2 TABLET ORAL EVERY 4 HOURS PRN
Status: DISCONTINUED | OUTPATIENT
Start: 2023-07-06 | End: 2023-07-07 | Stop reason: HOSPADM

## 2023-07-06 RX ORDER — ONDANSETRON 2 MG/ML
INJECTION INTRAMUSCULAR; INTRAVENOUS PRN
Status: DISCONTINUED | OUTPATIENT
Start: 2023-07-06 | End: 2023-07-06 | Stop reason: SDUPTHER

## 2023-07-06 RX ORDER — BUPIVACAINE HYDROCHLORIDE AND EPINEPHRINE 5; 5 MG/ML; UG/ML
INJECTION, SOLUTION EPIDURAL; INTRACAUDAL; PERINEURAL PRN
Status: DISCONTINUED | OUTPATIENT
Start: 2023-07-06 | End: 2023-07-06 | Stop reason: ALTCHOICE

## 2023-07-06 RX ORDER — HYDROMORPHONE HYDROCHLORIDE 1 MG/ML
0.5 INJECTION, SOLUTION INTRAMUSCULAR; INTRAVENOUS; SUBCUTANEOUS
Status: DISCONTINUED | OUTPATIENT
Start: 2023-07-06 | End: 2023-07-07 | Stop reason: HOSPADM

## 2023-07-06 RX ORDER — SODIUM CHLORIDE 9 MG/ML
INJECTION, SOLUTION INTRAVENOUS PRN
Status: DISCONTINUED | OUTPATIENT
Start: 2023-07-06 | End: 2023-07-06 | Stop reason: HOSPADM

## 2023-07-06 RX ORDER — FENTANYL CITRATE 50 UG/ML
INJECTION, SOLUTION INTRAMUSCULAR; INTRAVENOUS PRN
Status: DISCONTINUED | OUTPATIENT
Start: 2023-07-06 | End: 2023-07-06 | Stop reason: SDUPTHER

## 2023-07-06 RX ORDER — MIDAZOLAM HYDROCHLORIDE 1 MG/ML
INJECTION INTRAMUSCULAR; INTRAVENOUS PRN
Status: DISCONTINUED | OUTPATIENT
Start: 2023-07-06 | End: 2023-07-06 | Stop reason: SDUPTHER

## 2023-07-06 RX ORDER — KETOROLAC TROMETHAMINE 30 MG/ML
INJECTION, SOLUTION INTRAMUSCULAR; INTRAVENOUS PRN
Status: DISCONTINUED | OUTPATIENT
Start: 2023-07-06 | End: 2023-07-06 | Stop reason: SDUPTHER

## 2023-07-06 RX ORDER — LIDOCAINE HYDROCHLORIDE 20 MG/ML
INJECTION, SOLUTION INTRAVENOUS PRN
Status: DISCONTINUED | OUTPATIENT
Start: 2023-07-06 | End: 2023-07-06 | Stop reason: SDUPTHER

## 2023-07-06 RX ORDER — PROPOFOL 10 MG/ML
INJECTION, EMULSION INTRAVENOUS PRN
Status: DISCONTINUED | OUTPATIENT
Start: 2023-07-06 | End: 2023-07-06 | Stop reason: SDUPTHER

## 2023-07-06 RX ADMIN — ACETAMINOPHEN 650 MG: 325 TABLET ORAL at 17:04

## 2023-07-06 RX ADMIN — SODIUM CHLORIDE, PRESERVATIVE FREE 10 ML: 5 INJECTION INTRAVENOUS at 07:50

## 2023-07-06 RX ADMIN — SUGAMMADEX 172 MG: 100 INJECTION, SOLUTION INTRAVENOUS at 10:20

## 2023-07-06 RX ADMIN — ONDANSETRON 4 MG: 2 INJECTION INTRAMUSCULAR; INTRAVENOUS at 08:00

## 2023-07-06 RX ADMIN — DEXAMETHASONE SODIUM PHOSPHATE 10 MG: 10 INJECTION INTRAMUSCULAR; INTRAVENOUS at 09:33

## 2023-07-06 RX ADMIN — METRONIDAZOLE 500 MG: 500 INJECTION, SOLUTION INTRAVENOUS at 12:58

## 2023-07-06 RX ADMIN — PROPOFOL 170 MG: 10 INJECTION, EMULSION INTRAVENOUS at 09:31

## 2023-07-06 RX ADMIN — METRONIDAZOLE 500 MG: 500 INJECTION, SOLUTION INTRAVENOUS at 03:27

## 2023-07-06 RX ADMIN — CALCIUM CARBONATE 500 MG: 500 TABLET, CHEWABLE ORAL at 06:02

## 2023-07-06 RX ADMIN — Medication 50 MG: at 09:35

## 2023-07-06 RX ADMIN — SODIUM CHLORIDE 40 MG: 9 INJECTION INTRAMUSCULAR; INTRAVENOUS; SUBCUTANEOUS at 07:49

## 2023-07-06 RX ADMIN — ONDANSETRON HYDROCHLORIDE 4 MG: 2 SOLUTION INTRAMUSCULAR; INTRAVENOUS at 10:18

## 2023-07-06 RX ADMIN — LIDOCAINE HYDROCHLORIDE 80 MG: 20 INJECTION, SOLUTION INTRAVENOUS at 09:31

## 2023-07-06 RX ADMIN — SODIUM CHLORIDE: 9 INJECTION, SOLUTION INTRAVENOUS at 09:14

## 2023-07-06 RX ADMIN — SODIUM CHLORIDE, PRESERVATIVE FREE 10 ML: 5 INJECTION INTRAVENOUS at 21:32

## 2023-07-06 RX ADMIN — SODIUM CHLORIDE, POTASSIUM CHLORIDE, SODIUM LACTATE AND CALCIUM CHLORIDE: 600; 310; 30; 20 INJECTION, SOLUTION INTRAVENOUS at 06:37

## 2023-07-06 RX ADMIN — HYDROMORPHONE HYDROCHLORIDE 0.5 MG: 1 INJECTION, SOLUTION INTRAMUSCULAR; INTRAVENOUS; SUBCUTANEOUS at 11:24

## 2023-07-06 RX ADMIN — HYDROMORPHONE HYDROCHLORIDE 2 MG: 2 TABLET ORAL at 12:54

## 2023-07-06 RX ADMIN — FENTANYL CITRATE 100 MCG: 0.05 INJECTION, SOLUTION INTRAMUSCULAR; INTRAVENOUS at 09:31

## 2023-07-06 RX ADMIN — METRONIDAZOLE 500 MG: 500 INJECTION, SOLUTION INTRAVENOUS at 21:28

## 2023-07-06 RX ADMIN — SUMATRIPTAN SUCCINATE 100 MG: 50 TABLET ORAL at 21:41

## 2023-07-06 RX ADMIN — METRONIDAZOLE 500 MG: 500 SOLUTION INTRAVENOUS at 10:02

## 2023-07-06 RX ADMIN — MIDAZOLAM 2 MG: 1 INJECTION INTRAMUSCULAR; INTRAVENOUS at 09:28

## 2023-07-06 RX ADMIN — KETOROLAC TROMETHAMINE 30 MG: 30 INJECTION, SOLUTION INTRAMUSCULAR; INTRAVENOUS at 10:20

## 2023-07-06 RX ADMIN — CEFTRIAXONE SODIUM 2000 MG: 2 INJECTION, POWDER, FOR SOLUTION INTRAMUSCULAR; INTRAVENOUS at 21:26

## 2023-07-06 RX ADMIN — ACETAMINOPHEN 650 MG: 325 TABLET ORAL at 08:01

## 2023-07-06 ASSESSMENT — PAIN SCALES - GENERAL
PAINLEVEL_OUTOF10: 4
PAINLEVEL_OUTOF10: 0
PAINLEVEL_OUTOF10: 2
PAINLEVEL_OUTOF10: 0
PAINLEVEL_OUTOF10: 4
PAINLEVEL_OUTOF10: 2
PAINLEVEL_OUTOF10: 9
PAINLEVEL_OUTOF10: 7
PAINLEVEL_OUTOF10: 0
PAINLEVEL_OUTOF10: 3
PAINLEVEL_OUTOF10: 8
PAINLEVEL_OUTOF10: 0
PAINLEVEL_OUTOF10: 0

## 2023-07-06 ASSESSMENT — PAIN DESCRIPTION - ONSET
ONSET: GRADUAL

## 2023-07-06 ASSESSMENT — PAIN - FUNCTIONAL ASSESSMENT
PAIN_FUNCTIONAL_ASSESSMENT: PREVENTS OR INTERFERES SOME ACTIVE ACTIVITIES AND ADLS
PAIN_FUNCTIONAL_ASSESSMENT: ACTIVITIES ARE NOT PREVENTED

## 2023-07-06 ASSESSMENT — PAIN DESCRIPTION - DESCRIPTORS
DESCRIPTORS: ACHING
DESCRIPTORS: DISCOMFORT
DESCRIPTORS: ACHING
DESCRIPTORS: ACHING
DESCRIPTORS: DISCOMFORT
DESCRIPTORS: ACHING

## 2023-07-06 ASSESSMENT — PAIN DESCRIPTION - FREQUENCY
FREQUENCY: INTERMITTENT
FREQUENCY: CONTINUOUS
FREQUENCY: INTERMITTENT
FREQUENCY: CONTINUOUS
FREQUENCY: INTERMITTENT

## 2023-07-06 ASSESSMENT — PAIN DESCRIPTION - ORIENTATION
ORIENTATION: ANTERIOR

## 2023-07-06 ASSESSMENT — PAIN DESCRIPTION - LOCATION
LOCATION: ABDOMEN
LOCATION: HEAD
LOCATION: ABDOMEN
LOCATION: ABDOMEN

## 2023-07-06 ASSESSMENT — PAIN DESCRIPTION - PAIN TYPE
TYPE: SURGICAL PAIN;ACUTE PAIN
TYPE: ACUTE PAIN;SURGICAL PAIN
TYPE: SURGICAL PAIN
TYPE: ACUTE PAIN
TYPE: SURGICAL PAIN

## 2023-07-06 NOTE — OP NOTE
OP Note      Date: 7/6/2023  Time: 10:34 AM      Nataliia Hare is a 56 y.o. female. Pre-op Diagnosis:   Cholecystitis [K81.9]    Post-op Diagnosis List:  Cholecystitis [K81.9]       Procedure(s):  CHOLECYSTECTOMY LAPAROSCOPIC    Surgeon:  Surgeon(s):  Rebecca Acosta MD    Staff:   Circulator: Hoang Michel RN; Flaquita Moctezuma RN  Scrub Person First: Kei Buck    Anesthesia:  General    Findings: normal appearing gallbladder     EBL: 12ml    Drains: None    Specimens:  ID Type Source Tests Collected by Time Destination   A : Gallbladder Tissue Gallbladder SURGICAL PATHOLOGY Rebecca Acosta MD 7/6/2023 0490          Complications:  None    Disposition of Patient:  Tolerated procedure well    CDC Wound Closure Status:  Primary    Surgical Course:  Patient is a 56 y.o. female who was diagnosed with acute cholecystitis. Risks benefits and alternatives of the procedure, including bleeding, infection, bile leak, CBD injury were discussed with the patient. The planned procedure was agreed to and informed consent was obtained. After informed consent was obtained patient brought to operating table placed in supine position. General anesthesia was induced. Patient is prepped draped in usual sterile fashion. Time-out was performed identifying correct patient procedure. SCDs were on and functional.  Patient received appropriate perioperative antibiotics. A Veress needle was placed in the LUQ and insufllated to normal working pressure. Next a 5mm periumbilical incision was made and A 5mm working port was then placed at the supraumbilical site adhesions were noted. Following this a  12 mm incision was made in the midline epigastrium and a 12 mm trocar was placed under direct visualization. Two 5 mm working ports were placed across along the right costal margin. All ports were placed under visualization without any injury the abdominal contents.   Scissors were used to take down some periumbilical

## 2023-07-06 NOTE — PLAN OF CARE
Problem: Discharge Planning  Goal: Discharge to home or other facility with appropriate resources  Outcome: Progressing  Flowsheets (Taken 7/6/2023 0800)  Discharge to home or other facility with appropriate resources: Identify barriers to discharge with patient and caregiver     Problem: Pain  Goal: Verbalizes/displays adequate comfort level or baseline comfort level  7/6/2023 0954 by Joan Samson RN  Outcome: Progressing  Flowsheets (Taken 7/6/2023 0820)  Verbalizes/displays adequate comfort level or baseline comfort level: Encourage patient to monitor pain and request assistance  7/6/2023 0029 by Dasha Dias RN  Outcome: Progressing  7/5/2023 2140 by Jamari Garcia RN  Outcome: Progressing     Problem: ABCDS Injury Assessment  Goal: Absence of physical injury  7/6/2023 0954 by Joan Samson RN  Outcome: Progressing  7/6/2023 0029 by Dasha Dias RN  Outcome: Progressing

## 2023-07-06 NOTE — PLAN OF CARE
Problem: Pain  Goal: Verbalizes/displays adequate comfort level or baseline comfort level  7/6/2023 0029 by Areli Schulz RN  Outcome: Progressing     Problem: ABCDS Injury Assessment  Goal: Absence of physical injury  Outcome: Progressing

## 2023-07-06 NOTE — PLAN OF CARE
Pt reported her severe migraine headache pain getting down to 5-6 today ( from a 9/10). [facility] offering to bring in the [address] rx fr home on 7. 6.    will inquire re order/allowance to use this home med. Up to commode to void. Supportive fam all around thru the day. Reports some improved radiating abd pain relief w k pad on her back   Declined the offer of oxy and morphine b/o making pain worse.          Report passed along re part of DB yet needing to be completed

## 2023-07-06 NOTE — ANESTHESIA POSTPROCEDURE EVALUATION
Department of Anesthesiology  Postprocedure Note    Patient: Massiel Crump  MRN: [id number]  YOB: 1965  Date of evaluation: 7/6/2023      Procedure Summary     Date: 07/06/23 Room / Location: [place] [address] / [facility]    Anesthesia Start: 7569 Anesthesia Stop:     Procedure: CHOLECYSTECTOMY LAPAROSCOPIC (Abdomen) Diagnosis:       Cholecystitis      (Cholecystitis [K81.9])    Surgeons: Jose Armando Garcia MD Responsible Provider: Dg Lozano DO    Anesthesia Type: General ASA Status: 3          Anesthesia Type: General    Felicita Phase I: Felicita Score: 8    Felicita Phase II:        Anesthesia Post Evaluation    Patient location during evaluation: PACU  Patient participation: complete - patient participated  Level of consciousness: awake and responsive to verbal stimuli  Pain score: 0  Airway patency: patent  Nausea & Vomiting: no nausea and no vomiting  Complications: no  Cardiovascular status: hemodynamically stable and blood pressure returned to baseline  Respiratory status: acceptable, face mask, nonlabored ventilation and spontaneous ventilation  Hydration status: euvolemic  Multimodal analgesia pain management approach

## 2023-07-06 NOTE — DISCHARGE SUMMARY
Physician Discharge Summary     Patient ID:  Carlos Escobar  [id number]  56 y.o.  1965    Admit date: 7/5/2023    Discharge date and time: No discharge date for patient encounter. Admitting Physician: Christinia Kawasaki, MD     Admission Diagnoses: Acute cholecystitis [K81.0]  Cholecystitis [K81.9]    Discharge Diagnoses: Principal Problem:    Cholecystitis  Resolved Problems:    * No resolved hospital problems. *      Admission Condition: fair    Discharged Condition: stable    Indication for Admission: Abdominal pain    Hospital Course/Procedures/Operation/treatments:   7/5: 57 y/o F that presented with epigastric abdominal pain and CT showing gallstones with pericholecystic fluid. RUQ was normal but clinically acting like cholecystitis vs symptomatic cholelithiasis. LFTs uptrended. Admitted, started IV abx  7/6: Plan for laparoscopic cholecystectomy today. Check lipase. 7/7: No acute issues. Feels much better after having her cholecystectomy. DC home today. Consults:   IP CONSULT TO GENERAL SURGERY    Significant Diagnostic Studies:   US GALLBLADDER RUQ    Result Date: 7/5/2023  EXAMINATION: RIGHT UPPER QUADRANT ULTRASOUND 7/5/2023 10:17 am COMPARISON: None. HISTORY: ORDERING SYSTEM PROVIDED HISTORY: eval acute cholecystitis TECHNOLOGIST PROVIDED HISTORY: Reason for exam:->eval acute cholecystitis What reading provider will be dictating this exam?->CRC FINDINGS: Cholelithiasis. No gallbladder wall thickening, sludge or pericholecystic fluid. Normal common bile duct is 6.2 mm. Cholelithiasis in an otherwise normal-appearing gallbladder. Normal common bile duct.        Discharge Exam:  GENERAL:  Laying in bed, awake, alert, cooperative, no apparent distress  HEAD: Normocephalic, atraumatic  EYES: No sclera icterus, pupils equal  LUNGS:  No increased work of breathing  CARDIOVASCULAR:  RR and normotensive  ABDOMEN:  Soft, appropriate TTP around incisions, incisions c/d/i, non-distended  EXTREMITIES: No

## 2023-07-06 NOTE — DISCHARGE INSTRUCTIONS
Home Care   Keep the incision area clean and dry   Okay to take a shower starting tomorrow. Place ice on incisions to decrease the pain and bruising. Physical Activity   Walk frequently to prevent blood clots in the legs, but do not do anything that causes pain in the incisions. Breath deeply every hour to prevent pneumonia. No heavy lifting over 10lbs for 4-6wks. Work   Okay to return to work with light duty next week when your pain is controlled   Avoid Heavy lifting while at work   Await follow-up appointment in 2 weeks for full work clearance   Medications   Restart blood thinners in 24 hours if no sign of bleeding   Take Ibuprofen for moderate pain. Use the Oxycodone for more severe pain. No driving while taking prescription pain medication   Follow-up   Schedule a follow-up appointment with Dr. Jael Rubio in 2 weeks. Call Your Doctor If Any of the Following Occurs   Signs of infection, including fever and chills   Redness, swelling, increasing pain, excessive bleeding, or discharge at the incision site   Cough, shortness of breath, chest pain   Increased abdominal pain   Nausea and/or vomiting that does not resolve off narcotics. Pain, burning, urgency or frequency of urination, or blood in the urine   Pain and/or swelling in your feet, calves, or legs   Dark urine, light stools, or evidence of jaundice (yellowing of the skin or eyes). In case of an emergency, CALL 911 immediately.

## 2023-07-06 NOTE — PROGRESS NOTES
4 Eyes Skin Assessment     NAME:  Fuentes El  YOB: 1965  MEDICAL RECORD NUMBER:  [id number]    The patient is being assessed for  Admission    I agree that at least one RN has performed a thorough Head to Toe Skin Assessment on the patient. ALL assessment sites listed below have been assessed. Areas assessed by both nurses:    Head, Face, Ears        Does the Patient have a Wound?  No noted wound(s)       Abiel Prevention initiated by RN: No  Wound Care Orders initiated by RN: No    Pressure Injury (Stage 3,4, Unstageable, DTI, NWPT, and Complex wounds) if present, place Wound referral order by RN under : No    New Ostomies, if present place, Ostomy referral order under : No     Nurse 1 eSignature: Electronically signed by Francisco Diaz RN on 7/5/23 at 9:47 PM EDT    **SHARE this note so that the co-signing nurse can place an eSignature**    Nurse 2 eSignature: {Esignature:[id number]}

## 2023-07-07 VITALS
SYSTOLIC BLOOD PRESSURE: 116 MMHG | HEART RATE: 80 BPM | RESPIRATION RATE: 19 BRPM | DIASTOLIC BLOOD PRESSURE: 80 MMHG | OXYGEN SATURATION: 98 % | TEMPERATURE: 97.4 F

## 2023-07-07 LAB
ALBUMIN SERPL-MCNC: 3.4 G/DL (ref 3.5–5.2)
ALP SERPL-CCNC: 165 U/L (ref 35–104)
ALT SERPL-CCNC: 227 U/L (ref 0–32)
ANION GAP SERPL CALCULATED.3IONS-SCNC: 12 MMOL/L (ref 7–16)
AST SERPL-CCNC: 156 U/L (ref 0–31)
BASOPHILS # BLD: 0.01 E9/L (ref 0–0.2)
BASOPHILS NFR BLD: 0.1 % (ref 0–2)
BILIRUB SERPL-MCNC: 0.5 MG/DL (ref 0–1.2)
BUN SERPL-MCNC: 10 MG/DL (ref 6–20)
CALCIUM SERPL-MCNC: 9.5 MG/DL (ref 8.6–10.2)
CHLORIDE SERPL-SCNC: 109 MMOL/L (ref 98–107)
CO2 SERPL-SCNC: 24 MMOL/L (ref 22–29)
CREAT SERPL-MCNC: 0.7 MG/DL (ref 0.5–1)
EOSINOPHIL # BLD: 0.01 E9/L (ref 0.05–0.5)
EOSINOPHIL NFR BLD: 0.1 % (ref 0–6)
ERYTHROCYTE [DISTWIDTH] IN BLOOD BY AUTOMATED COUNT: 13.8 FL (ref 11.5–15)
GLUCOSE SERPL-MCNC: 113 MG/DL (ref 74–99)
HCT VFR BLD AUTO: 35.5 % (ref 34–48)
HGB BLD-MCNC: 11.2 G/DL (ref 11.5–15.5)
IMM GRANULOCYTES # BLD: 0.06 E9/L
IMM GRANULOCYTES NFR BLD: 0.6 % (ref 0–5)
LYMPHOCYTES # BLD: 1.05 E9/L (ref 1.5–4)
LYMPHOCYTES NFR BLD: 9.8 % (ref 20–42)
MAGNESIUM SERPL-MCNC: 1.9 MG/DL (ref 1.6–2.6)
MCH RBC QN AUTO: 28.4 PG (ref 26–35)
MCHC RBC AUTO-ENTMCNC: 31.5 % (ref 32–34.5)
MCV RBC AUTO: 89.9 FL (ref 80–99.9)
MONOCYTES # BLD: 0.92 E9/L (ref 0.1–0.95)
MONOCYTES NFR BLD: 8.6 % (ref 2–12)
NEUTROPHILS # BLD: 8.68 E9/L (ref 1.8–7.3)
NEUTS SEG NFR BLD: 80.8 % (ref 43–80)
PLATELET # BLD AUTO: 204 E9/L (ref 130–450)
PMV BLD AUTO: 12.2 FL (ref 7–12)
POTASSIUM SERPL-SCNC: 3.5 MMOL/L (ref 3.5–5)
PROT SERPL-MCNC: 6 G/DL (ref 6.4–8.3)
RBC # BLD AUTO: 3.95 E12/L (ref 3.5–5.5)
SODIUM SERPL-SCNC: 145 MMOL/L (ref 132–146)
WBC # BLD: 10.7 E9/L (ref 4.5–11.5)

## 2023-07-07 PROCEDURE — C9113 INJ PANTOPRAZOLE SODIUM, VIA: HCPCS | Performed by: SURGERY

## 2023-07-07 PROCEDURE — 36415 COLL VENOUS BLD VENIPUNCTURE: CPT

## 2023-07-07 PROCEDURE — 85025 COMPLETE CBC W/AUTO DIFF WBC: CPT

## 2023-07-07 PROCEDURE — 2580000003 HC RX 258: Performed by: SURGERY

## 2023-07-07 PROCEDURE — 6360000002 HC RX W HCPCS: Performed by: SURGERY

## 2023-07-07 PROCEDURE — 6370000000 HC RX 637 (ALT 250 FOR IP): Performed by: SURGERY

## 2023-07-07 PROCEDURE — 83735 ASSAY OF MAGNESIUM: CPT

## 2023-07-07 PROCEDURE — 80053 COMPREHEN METABOLIC PANEL: CPT

## 2023-07-07 PROCEDURE — A4216 STERILE WATER/SALINE, 10 ML: HCPCS | Performed by: SURGERY

## 2023-07-07 RX ORDER — METHOCARBAMOL 750 MG/1
750 TABLET, FILM COATED ORAL 4 TIMES DAILY
Qty: 40 TABLET | Refills: 0 | Status: SHIPPED | OUTPATIENT
Start: 2023-07-07 | End: 2023-07-17

## 2023-07-07 RX ORDER — OXYCODONE HYDROCHLORIDE 5 MG/1
5 TABLET ORAL EVERY 6 HOURS PRN
Qty: 20 TABLET | Refills: 0 | Status: SHIPPED | OUTPATIENT
Start: 2023-07-07 | End: 2023-07-07 | Stop reason: HOSPADM

## 2023-07-07 RX ORDER — ONDANSETRON 4 MG/1
4 TABLET, FILM COATED ORAL 4 TIMES DAILY PRN
Qty: 30 TABLET | Refills: 0 | Status: SHIPPED | OUTPATIENT
Start: 2023-07-07

## 2023-07-07 RX ORDER — HYDROCODONE BITARTRATE AND ACETAMINOPHEN 5; 325 MG/1; MG/1
1 TABLET ORAL EVERY 6 HOURS PRN
Qty: 20 TABLET | Refills: 0 | Status: SHIPPED | OUTPATIENT
Start: 2023-07-07 | End: 2023-07-12

## 2023-07-07 RX ADMIN — METRONIDAZOLE 500 MG: 500 INJECTION, SOLUTION INTRAVENOUS at 04:53

## 2023-07-07 RX ADMIN — SODIUM CHLORIDE 40 MG: 9 INJECTION INTRAMUSCULAR; INTRAVENOUS; SUBCUTANEOUS at 09:54

## 2023-07-07 RX ADMIN — ONDANSETRON 4 MG: 4 TABLET, ORALLY DISINTEGRATING ORAL at 09:52

## 2023-07-07 RX ADMIN — SODIUM CHLORIDE, POTASSIUM CHLORIDE, SODIUM LACTATE AND CALCIUM CHLORIDE: 600; 310; 30; 20 INJECTION, SOLUTION INTRAVENOUS at 04:57

## 2023-07-07 RX ADMIN — SODIUM CHLORIDE, PRESERVATIVE FREE 10 ML: 5 INJECTION INTRAVENOUS at 09:57

## 2023-07-07 RX ADMIN — HYDROMORPHONE HYDROCHLORIDE 2 MG: 2 TABLET ORAL at 09:52

## 2023-07-07 ASSESSMENT — PAIN DESCRIPTION - FREQUENCY: FREQUENCY: INTERMITTENT

## 2023-07-07 ASSESSMENT — PAIN DESCRIPTION - LOCATION: LOCATION: ABDOMEN

## 2023-07-07 ASSESSMENT — PAIN DESCRIPTION - DESCRIPTORS: DESCRIPTORS: ACHING

## 2023-07-07 ASSESSMENT — PAIN SCALES - GENERAL
PAINLEVEL_OUTOF10: 0
PAINLEVEL_OUTOF10: 7

## 2023-07-07 ASSESSMENT — PAIN DESCRIPTION - PAIN TYPE: TYPE: SURGICAL PAIN

## 2023-07-07 ASSESSMENT — PAIN - FUNCTIONAL ASSESSMENT: PAIN_FUNCTIONAL_ASSESSMENT: PREVENTS OR INTERFERES SOME ACTIVE ACTIVITIES AND ADLS

## 2023-07-07 ASSESSMENT — PAIN DESCRIPTION - ONSET: ONSET: GRADUAL

## 2023-07-07 ASSESSMENT — PAIN DESCRIPTION - ORIENTATION: ORIENTATION: ANTERIOR

## 2023-07-07 NOTE — PLAN OF CARE
Problem: Discharge Planning  Goal: Discharge to home or other facility with appropriate resources  7/7/2023 1230 by Reji Adler RN  Outcome: Completed  Flowsheets (Taken 7/7/2023 0800)  Discharge to home or other facility with appropriate resources: Identify barriers to discharge with patient and caregiver  7/7/2023 0201 by Keven Figueredo RN  Outcome: Progressing     Problem: Pain  Goal: Verbalizes/displays adequate comfort level or baseline comfort level  7/7/2023 1230 by Reji Adler RN  Outcome: Completed  Flowsheets (Taken 7/7/2023 0922)  Verbalizes/displays adequate comfort level or baseline comfort level: Encourage patient to monitor pain and request assistance  7/7/2023 0201 by Keven Figueredo RN  Outcome: Progressing  Flowsheets (Taken 7/6/2023 1254 by Reji Adler RN)  Verbalizes/displays adequate comfort level or baseline comfort level: Encourage patient to monitor pain and request assistance     Problem: ABCDS Injury Assessment  Goal: Absence of physical injury  Outcome: Completed

## 2023-07-07 NOTE — PLAN OF CARE
Problem: Discharge Planning  Goal: Discharge to home or other facility with appropriate resources  7/7/2023 1230 by Breanne Pickering RN  Outcome: Completed  Flowsheets (Taken 7/7/2023 0800)  Discharge to home or other facility with appropriate resources: Identify barriers to discharge with patient and caregiver  7/7/2023 0201 by Sagar Nguyen RN  Outcome: Progressing     Problem: Pain  Goal: Verbalizes/displays adequate comfort level or baseline comfort level  7/7/2023 1230 by Breanne Pickering RN  Outcome: Completed  Flowsheets (Taken 7/7/2023 0922)  Verbalizes/displays adequate comfort level or baseline comfort level: Encourage patient to monitor pain and request assistance  7/7/2023 0201 by Sagar Nguyen RN  Outcome: Progressing  Flowsheets (Taken 7/6/2023 1254 by Breanne Pickering RN)  Verbalizes/displays adequate comfort level or baseline comfort level: Encourage patient to monitor pain and request assistance     Problem: ABCDS Injury Assessment  Goal: Absence of physical injury  Outcome: Completed

## 2023-07-07 NOTE — PLAN OF CARE
Problem: Discharge Planning  Goal: Discharge to home or other facility with appropriate resources  Outcome: Progressing     Problem: Pain  Goal: Verbalizes/displays adequate comfort level or baseline comfort level  Outcome: Progressing  Flowsheets (Taken 7/6/2023 1254 by Hellen Hernandez RN)  Verbalizes/displays adequate comfort level or baseline comfort level: Encourage patient to monitor pain and request assistance

## 2023-07-07 NOTE — CARE COORDINATION
7/7. POD#1 Laparoscopic Cholecystectomy. Discharge order noted. The pt will return home with family when medically stable.  Porter Hernandez RN

## 2023-07-07 NOTE — PROGRESS NOTES
CLINICAL PHARMACY NOTE: MEDS TO BEDS    Total # of Prescriptions Filled: 2   The following medications were delivered to the patient:  Methocarbamol 750mg  Norco 5-325mg    Additional Documentation:  Patient's family picked up in pharmacy 7-7-23 @10:33am

## 2023-07-26 ENCOUNTER — OFFICE VISIT (OUTPATIENT)
Dept: SURGERY | Age: 58
End: 2023-07-26

## 2023-07-26 VITALS
BODY MASS INDEX: 28.79 KG/M2 | HEIGHT: 68 IN | HEART RATE: 66 BPM | DIASTOLIC BLOOD PRESSURE: 79 MMHG | RESPIRATION RATE: 18 BRPM | WEIGHT: 190 LBS | SYSTOLIC BLOOD PRESSURE: 123 MMHG | TEMPERATURE: 98.2 F

## 2023-07-26 DIAGNOSIS — Z48.89 ENCOUNTER FOR POSTOPERATIVE CARE: Primary | ICD-10-CM

## 2023-07-26 PROCEDURE — 99024 POSTOP FOLLOW-UP VISIT: CPT | Performed by: SURGERY

## 2024-01-22 ENCOUNTER — HOSPITAL ENCOUNTER (OUTPATIENT)
Dept: GENERAL RADIOLOGY | Age: 59
Discharge: HOME | End: 2024-01-24
Payer: COMMERCIAL

## 2024-01-22 VITALS — WEIGHT: 190 LBS | HEIGHT: 68 IN | BODY MASS INDEX: 28.79 KG/M2

## 2024-01-22 DIAGNOSIS — Z12.31 ENCOUNTER FOR SCREENING MAMMOGRAM FOR MALIGNANT NEOPLASM OF BREAST: ICD-10-CM

## 2024-01-22 PROCEDURE — 77063 BREAST TOMOSYNTHESIS BI: CPT

## 2024-01-25 ENCOUNTER — TELEPHONE (OUTPATIENT)
Dept: GENERAL RADIOLOGY | Age: 59
End: 2024-01-25

## 2024-01-25 NOTE — TELEPHONE ENCOUNTER
Call to patient in reference to her mammogram performed at [facility] on January 22, 2024.  Instructed patient that the radiologist has recommended some additional breast imaging, in order to make a final determination/result. Next, a  from [facility] will contact her to schedule the additional imaging study/studies. Verbalizes understanding and is agreeable to proceed.

## 2024-02-06 ENCOUNTER — HOSPITAL ENCOUNTER (OUTPATIENT)
Dept: GENERAL RADIOLOGY | Age: 59
Discharge: HOME | End: 2024-02-08
Payer: COMMERCIAL

## 2024-02-06 DIAGNOSIS — R92.8 ABNORMAL MAMMOGRAM: ICD-10-CM

## 2024-02-06 PROCEDURE — G0279 TOMOSYNTHESIS, MAMMO: HCPCS

## 2024-10-08 RX ORDER — ESCITALOPRAM OXALATE 20 MG/1
20 TABLET ORAL DAILY
Qty: 90 TABLET | Refills: 3 | Status: SHIPPED | OUTPATIENT
Start: 2024-10-08

## 2024-12-02 DIAGNOSIS — B96.89 ACUTE BACTERIAL SINUSITIS: Primary | ICD-10-CM

## 2024-12-02 DIAGNOSIS — J01.90 ACUTE BACTERIAL SINUSITIS: Primary | ICD-10-CM

## 2024-12-02 RX ORDER — AZITHROMYCIN 250 MG/1
TABLET, FILM COATED ORAL
Qty: 6 TABLET | Refills: 0 | Status: SHIPPED | OUTPATIENT
Start: 2024-12-02 | End: 2024-12-12

## 2024-12-02 NOTE — TELEPHONE ENCOUNTER
She started yesterday with symptoms of her teeth hurt and this is a sure sign of a sinus infection, worse on the right side. She has a little bit of congestion and drainage. She had a migraine on Saturday but it resolved. She is COVID negative. She is taking Ibuprofen 800mg. She would like an antibiotic at [facility]

## 2025-02-03 SDOH — ECONOMIC STABILITY: INCOME INSECURITY: IN THE LAST 12 MONTHS, WAS THERE A TIME WHEN YOU WERE NOT ABLE TO PAY THE MORTGAGE OR RENT ON TIME?: NO

## 2025-02-03 SDOH — ECONOMIC STABILITY: FOOD INSECURITY: WITHIN THE PAST 12 MONTHS, THE FOOD YOU BOUGHT JUST DIDN'T LAST AND YOU DIDN'T HAVE MONEY TO GET MORE.: NEVER TRUE

## 2025-02-03 SDOH — ECONOMIC STABILITY: TRANSPORTATION INSECURITY
IN THE PAST 12 MONTHS, HAS THE LACK OF TRANSPORTATION KEPT YOU FROM MEDICAL APPOINTMENTS OR FROM GETTING MEDICATIONS?: NO

## 2025-02-03 SDOH — ECONOMIC STABILITY: FOOD INSECURITY: WITHIN THE PAST 12 MONTHS, YOU WORRIED THAT YOUR FOOD WOULD RUN OUT BEFORE YOU GOT MONEY TO BUY MORE.: NEVER TRUE

## 2025-02-03 SDOH — ECONOMIC STABILITY: TRANSPORTATION INSECURITY
IN THE PAST 12 MONTHS, HAS LACK OF TRANSPORTATION KEPT YOU FROM MEETINGS, WORK, OR FROM GETTING THINGS NEEDED FOR DAILY LIVING?: NO

## 2025-02-04 ENCOUNTER — OFFICE VISIT (OUTPATIENT)
Age: 60
End: 2025-02-04
Payer: COMMERCIAL

## 2025-02-04 VITALS
TEMPERATURE: 97.7 F | HEART RATE: 90 BPM | BODY MASS INDEX: 31.83 KG/M2 | RESPIRATION RATE: 16 BRPM | SYSTOLIC BLOOD PRESSURE: 105 MMHG | HEIGHT: 68 IN | WEIGHT: 210 LBS | OXYGEN SATURATION: 98 % | DIASTOLIC BLOOD PRESSURE: 70 MMHG

## 2025-02-04 DIAGNOSIS — F41.1 GAD (GENERALIZED ANXIETY DISORDER): ICD-10-CM

## 2025-02-04 DIAGNOSIS — E78.2 MIXED HYPERLIPIDEMIA: ICD-10-CM

## 2025-02-04 DIAGNOSIS — Z12.31 ENCOUNTER FOR SCREENING MAMMOGRAM FOR MALIGNANT NEOPLASM OF BREAST: Primary | ICD-10-CM

## 2025-02-04 DIAGNOSIS — F32.0 CURRENT MILD EPISODE OF MAJOR DEPRESSIVE DISORDER WITHOUT PRIOR EPISODE (HCC): ICD-10-CM

## 2025-02-04 DIAGNOSIS — G43.709 CHRONIC MIGRAINE WITHOUT AURA WITHOUT STATUS MIGRAINOSUS, NOT INTRACTABLE: ICD-10-CM

## 2025-02-04 PROCEDURE — 99214 OFFICE O/P EST MOD 30 MIN: CPT | Performed by: FAMILY MEDICINE

## 2025-02-04 PROCEDURE — 1036F TOBACCO NON-USER: CPT | Performed by: FAMILY MEDICINE

## 2025-02-04 PROCEDURE — G8417 CALC BMI ABV UP PARAM F/U: HCPCS | Performed by: FAMILY MEDICINE

## 2025-02-04 PROCEDURE — 3017F COLORECTAL CA SCREEN DOC REV: CPT | Performed by: FAMILY MEDICINE

## 2025-02-04 PROCEDURE — G8427 DOCREV CUR MEDS BY ELIG CLIN: HCPCS | Performed by: FAMILY MEDICINE

## 2025-02-04 RX ORDER — IBUPROFEN 800 MG/1
1 TABLET, FILM COATED ORAL EVERY 8 HOURS PRN
COMMUNITY
Start: 2025-01-21

## 2025-02-04 ASSESSMENT — PATIENT HEALTH QUESTIONNAIRE - PHQ9
1. LITTLE INTEREST OR PLEASURE IN DOING THINGS: NOT AT ALL
2. FEELING DOWN, DEPRESSED OR HOPELESS: NOT AT ALL
SUM OF ALL RESPONSES TO PHQ QUESTIONS 1-9: 0
SUM OF ALL RESPONSES TO PHQ QUESTIONS 1-9: 0
SUM OF ALL RESPONSES TO PHQ9 QUESTIONS 1 & 2: 0
SUM OF ALL RESPONSES TO PHQ QUESTIONS 1-9: 0
SUM OF ALL RESPONSES TO PHQ QUESTIONS 1-9: 0

## 2025-02-04 ASSESSMENT — ENCOUNTER SYMPTOMS
EYES NEGATIVE: 1
GASTROINTESTINAL NEGATIVE: 1
FACIAL SWELLING: 0
ALLERGIC/IMMUNOLOGIC NEGATIVE: 1
SINUS PAIN: 0
RESPIRATORY NEGATIVE: 1
RECTAL PAIN: 0

## 2025-02-04 NOTE — ASSESSMENT & PLAN NOTE
Chronic, at goal (stable), continue current treatment plan, medication adherence emphasized, and lifestyle modifications recommended we are going to start her on Zepbound    Orders:    tirzepatide-weight management (ZEPBOUND) 2.5 MG/0.5ML SOAJ subCUTAneous auto-injector pen; Inject 2.5 mg into the skin every 7 days

## 2025-02-04 NOTE — PROGRESS NOTES
Zayda Chauhan (:  1965) is a 59 y.o. female,Established patient, here for evaluation of the following chief complaint(s):  Hyperlipidemia (Pt states doing ok, no recent labs done.  ) and Anxiety         Assessment & Plan  Chronic migraine without aura without status migrainosus, not intractable   Chronic, at goal (stable), continue current treatment plan, medication adherence emphasized, and lifestyle modifications recommended         LU (generalized anxiety disorder)   Chronic, at goal (stable), continue current treatment plan, medication adherence emphasized, and lifestyle modifications recommended         Current mild episode of major depressive disorder without prior episode (HCC)   Chronic, at goal (stable), continue current treatment plan         Adult BMI 31.0-31.9 kg/sq m   Chronic, at goal (stable), continue current treatment plan, medication adherence emphasized, and lifestyle modifications recommended we are going to start her on Zepbound    Orders:    tirzepatide-weight management (ZEPBOUND) 2.5 MG/0.5ML SOAJ subCUTAneous auto-injector pen; Inject 2.5 mg into the skin every 7 days    Mixed hyperlipidemia   Chronic, not at goal (unstable), continue current treatment plan    Orders:    Comprehensive Metabolic Panel, Fasting; Future    CBC with Auto Differential; Future    Lipid, Fasting; Future    Encounter for screening mammogram for malignant neoplasm of breast   Chronic, at goal (stable), continue current treatment plan    Orders:    [facility] RICKEY DIGITAL SCREEN BILATERAL PER PROTOCOL; Future      No follow-ups on file.     In summary this patient is going to be set up on Zepbound please see below she will get blood work as an outpatient she will call when she needs refills I will see her back again in 6 months unless she needs me earlier  Subjective   HPI:  Patient comes in today for a 6-month follow-up.  She has a history of generalized anxiety depression all stable on her Lexapro.  Her

## 2025-02-18 ENCOUNTER — HOSPITAL ENCOUNTER (OUTPATIENT)
Dept: MAMMOGRAPHY | Age: 60
Discharge: HOME | End: 2025-02-20
Attending: FAMILY MEDICINE

## 2025-02-18 DIAGNOSIS — Z12.31 ENCOUNTER FOR SCREENING MAMMOGRAM FOR MALIGNANT NEOPLASM OF BREAST: ICD-10-CM

## 2025-04-02 DIAGNOSIS — J20.8 ACUTE BACTERIAL BRONCHITIS: Primary | ICD-10-CM

## 2025-04-02 DIAGNOSIS — B96.89 ACUTE BACTERIAL BRONCHITIS: Primary | ICD-10-CM

## 2025-04-02 RX ORDER — AZITHROMYCIN 250 MG/1
TABLET, FILM COATED ORAL
Qty: 6 TABLET | Refills: 0 | Status: SHIPPED | OUTPATIENT
Start: 2025-04-02

## 2025-04-02 RX ORDER — ALBUTEROL SULFATE 90 UG/1
2 INHALANT RESPIRATORY (INHALATION) 4 TIMES DAILY PRN
Qty: 1 EACH | Refills: 0 | Status: SHIPPED | OUTPATIENT
Start: 2025-04-02 | End: 2025-05-02

## 2025-04-02 RX ORDER — BROMPHENIRAMINE MALEATE, PSEUDOEPHEDRINE HYDROCHLORIDE, AND DEXTROMETHORPHAN HYDROBROMIDE 2; 30; 10 MG/5ML; MG/5ML; MG/5ML
5 SYRUP ORAL 4 TIMES DAILY PRN
Qty: 120 ML | Refills: 0 | Status: SHIPPED | OUTPATIENT
Start: 2025-04-02

## 2025-04-02 NOTE — TELEPHONE ENCOUNTER
She has been with Rafia and now she is sick. She has sinus drainage and congestion, constant cough since Friday. She is using Mucinex and now wheezing a little bit . Her chest hurts and her ribs hurt from coughing. She wants antibiotic and cough medication at  [facility]

## 2025-04-10 ENCOUNTER — HOSPITAL ENCOUNTER (INPATIENT)
Age: 60
LOS: 3 days | Discharge: HOME | DRG: 139 | End: 2025-04-13
Attending: INTERNAL MEDICINE | Admitting: INTERNAL MEDICINE
Payer: COMMERCIAL

## 2025-04-10 ENCOUNTER — APPOINTMENT (OUTPATIENT)
Dept: CT IMAGING | Age: 60
DRG: 139 | End: 2025-04-10
Payer: COMMERCIAL

## 2025-04-10 DIAGNOSIS — S22.31XA CLOSED FRACTURE OF ONE RIB OF RIGHT SIDE, INITIAL ENCOUNTER: Primary | ICD-10-CM

## 2025-04-10 DIAGNOSIS — J18.9 PNEUMONIA OF LEFT LUNG DUE TO INFECTIOUS ORGANISM, UNSPECIFIED PART OF LUNG: ICD-10-CM

## 2025-04-10 LAB
ALBUMIN SERPL-MCNC: 4.6 G/DL (ref 3.5–5.2)
ALP SERPL-CCNC: 108 U/L (ref 35–104)
ALT SERPL-CCNC: 55 U/L (ref 0–32)
ANION GAP SERPL CALCULATED.3IONS-SCNC: 17 MMOL/L (ref 7–16)
AST SERPL-CCNC: 63 U/L (ref 0–31)
B PARAP IS1001 DNA NPH QL NAA+NON-PROBE: NOT DETECTED
B PERT DNA SPEC QL NAA+PROBE: NOT DETECTED
BASOPHILS # BLD: 0.11 K/UL (ref 0–0.2)
BASOPHILS NFR BLD: 1 % (ref 0–2)
BILIRUB SERPL-MCNC: 0.6 MG/DL (ref 0–1.2)
BUN SERPL-MCNC: 15 MG/DL (ref 6–20)
C PNEUM DNA NPH QL NAA+NON-PROBE: NOT DETECTED
CALCIUM SERPL-MCNC: 9.6 MG/DL (ref 8.6–10.2)
CHLORIDE SERPL-SCNC: 102 MMOL/L (ref 98–107)
CO2 SERPL-SCNC: 19 MMOL/L (ref 22–29)
CREAT SERPL-MCNC: 0.8 MG/DL (ref 0.5–1)
EOSINOPHIL # BLD: 0.09 K/UL (ref 0.05–0.5)
EOSINOPHILS RELATIVE PERCENT: 1 % (ref 0–6)
ERYTHROCYTE [DISTWIDTH] IN BLOOD BY AUTOMATED COUNT: 13.9 % (ref 11.5–15)
FLUAV RNA NPH QL NAA+NON-PROBE: NOT DETECTED
FLUBV RNA NPH QL NAA+NON-PROBE: NOT DETECTED
GFR, ESTIMATED: 89 ML/MIN/1.73M2
GLUCOSE SERPL-MCNC: 120 MG/DL (ref 74–99)
HADV DNA NPH QL NAA+NON-PROBE: NOT DETECTED
HCOV 229E RNA NPH QL NAA+NON-PROBE: NOT DETECTED
HCOV HKU1 RNA NPH QL NAA+NON-PROBE: NOT DETECTED
HCOV NL63 RNA NPH QL NAA+NON-PROBE: NOT DETECTED
HCOV OC43 RNA NPH QL NAA+NON-PROBE: NOT DETECTED
HCT VFR BLD AUTO: 41.7 % (ref 34–48)
HGB BLD-MCNC: 13.7 G/DL (ref 11.5–15.5)
HMPV RNA NPH QL NAA+NON-PROBE: NOT DETECTED
HPIV1 RNA NPH QL NAA+NON-PROBE: NOT DETECTED
HPIV2 RNA NPH QL NAA+NON-PROBE: NOT DETECTED
HPIV3 RNA NPH QL NAA+NON-PROBE: NOT DETECTED
HPIV4 RNA NPH QL NAA+NON-PROBE: NOT DETECTED
IMM GRANULOCYTES # BLD AUTO: 0.11 K/UL (ref 0–0.58)
IMM GRANULOCYTES NFR BLD: 1 % (ref 0–5)
LYMPHOCYTES NFR BLD: 1.93 K/UL (ref 1.5–4)
LYMPHOCYTES RELATIVE PERCENT: 15 % (ref 20–42)
M PNEUMO DNA NPH QL NAA+NON-PROBE: NOT DETECTED
MCH RBC QN AUTO: 29.3 PG (ref 26–35)
MCHC RBC AUTO-ENTMCNC: 32.9 G/DL (ref 32–34.5)
MCV RBC AUTO: 89.3 FL (ref 80–99.9)
MONOCYTES NFR BLD: 0.75 K/UL (ref 0.1–0.95)
MONOCYTES NFR BLD: 6 % (ref 2–12)
NEUTROPHILS NFR BLD: 77 % (ref 43–80)
NEUTS SEG NFR BLD: 9.78 K/UL (ref 1.8–7.3)
PLATELET # BLD AUTO: 393 K/UL (ref 130–450)
PMV BLD AUTO: 11.1 FL (ref 7–12)
POTASSIUM SERPL-SCNC: 4.4 MMOL/L (ref 3.5–5)
PROT SERPL-MCNC: 7.8 G/DL (ref 6.4–8.3)
RBC # BLD AUTO: 4.67 M/UL (ref 3.5–5.5)
RSV RNA NPH QL NAA+NON-PROBE: NOT DETECTED
RV+EV RNA NPH QL NAA+NON-PROBE: NOT DETECTED
SARS-COV-2 RNA NPH QL NAA+NON-PROBE: NOT DETECTED
SODIUM SERPL-SCNC: 138 MMOL/L (ref 132–146)
SPECIMEN DESCRIPTION: NORMAL
WBC OTHER # BLD: 12.8 K/UL (ref 4.5–11.5)

## 2025-04-10 PROCEDURE — 71250 CT THORAX DX C-: CPT

## 2025-04-10 PROCEDURE — 6370000000 HC RX 637 (ALT 250 FOR IP)

## 2025-04-10 PROCEDURE — 80053 COMPREHEN METABOLIC PANEL: CPT

## 2025-04-10 PROCEDURE — 99285 EMERGENCY DEPT VISIT HI MDM: CPT

## 2025-04-10 PROCEDURE — 2140000000 HC CCU INTERMEDIATE R&B

## 2025-04-10 PROCEDURE — 96375 TX/PRO/DX INJ NEW DRUG ADDON: CPT

## 2025-04-10 PROCEDURE — 72128 CT CHEST SPINE W/O DYE: CPT

## 2025-04-10 PROCEDURE — 2500000003 HC RX 250 WO HCPCS: Performed by: NURSE PRACTITIONER

## 2025-04-10 PROCEDURE — 85025 COMPLETE CBC W/AUTO DIFF WBC: CPT

## 2025-04-10 PROCEDURE — 87040 BLOOD CULTURE FOR BACTERIA: CPT

## 2025-04-10 PROCEDURE — 96372 THER/PROPH/DIAG INJ SC/IM: CPT

## 2025-04-10 PROCEDURE — 96374 THER/PROPH/DIAG INJ IV PUSH: CPT

## 2025-04-10 PROCEDURE — 6360000002 HC RX W HCPCS

## 2025-04-10 PROCEDURE — 6360000002 HC RX W HCPCS: Performed by: NURSE PRACTITIONER

## 2025-04-10 PROCEDURE — 0202U NFCT DS 22 TRGT SARS-COV-2: CPT

## 2025-04-10 PROCEDURE — 2500000003 HC RX 250 WO HCPCS

## 2025-04-10 RX ORDER — MORPHINE SULFATE 4 MG/ML
8 INJECTION, SOLUTION INTRAMUSCULAR; INTRAVENOUS ONCE
Status: COMPLETED | OUTPATIENT
Start: 2025-04-10 | End: 2025-04-10

## 2025-04-10 RX ORDER — KETOROLAC TROMETHAMINE 30 MG/ML
30 INJECTION, SOLUTION INTRAMUSCULAR; INTRAVENOUS ONCE
Status: COMPLETED | OUTPATIENT
Start: 2025-04-10 | End: 2025-04-10

## 2025-04-10 RX ORDER — SODIUM CHLORIDE 0.9 % (FLUSH) 0.9 %
5-40 SYRINGE (ML) INJECTION PRN
Status: DISCONTINUED | OUTPATIENT
Start: 2025-04-10 | End: 2025-04-13 | Stop reason: HOSPADM

## 2025-04-10 RX ORDER — MORPHINE SULFATE 2 MG/ML
1 INJECTION, SOLUTION INTRAMUSCULAR; INTRAVENOUS EVERY 6 HOURS PRN
Status: DISCONTINUED | OUTPATIENT
Start: 2025-04-10 | End: 2025-04-11

## 2025-04-10 RX ORDER — POTASSIUM CHLORIDE 1500 MG/1
40 TABLET, EXTENDED RELEASE ORAL PRN
Status: ACTIVE | OUTPATIENT
Start: 2025-04-10 | End: 2025-04-12

## 2025-04-10 RX ORDER — ALBUTEROL SULFATE 0.83 MG/ML
2.5 SOLUTION RESPIRATORY (INHALATION) EVERY 6 HOURS PRN
Status: DISCONTINUED | OUTPATIENT
Start: 2025-04-10 | End: 2025-04-13 | Stop reason: HOSPADM

## 2025-04-10 RX ORDER — FENTANYL CITRATE 50 UG/ML
50 INJECTION, SOLUTION INTRAMUSCULAR; INTRAVENOUS ONCE
Status: COMPLETED | OUTPATIENT
Start: 2025-04-10 | End: 2025-04-10

## 2025-04-10 RX ORDER — ENOXAPARIN SODIUM 100 MG/ML
40 INJECTION SUBCUTANEOUS DAILY
Status: DISCONTINUED | OUTPATIENT
Start: 2025-04-10 | End: 2025-04-13 | Stop reason: HOSPADM

## 2025-04-10 RX ORDER — MAGNESIUM SULFATE IN WATER 40 MG/ML
2000 INJECTION, SOLUTION INTRAVENOUS PRN
Status: DISCONTINUED | OUTPATIENT
Start: 2025-04-10 | End: 2025-04-13 | Stop reason: HOSPADM

## 2025-04-10 RX ORDER — DOXYCYCLINE 100 MG/1
100 CAPSULE ORAL ONCE
Status: COMPLETED | OUTPATIENT
Start: 2025-04-10 | End: 2025-04-10

## 2025-04-10 RX ORDER — SODIUM CHLORIDE 9 MG/ML
INJECTION, SOLUTION INTRAVENOUS PRN
Status: DISCONTINUED | OUTPATIENT
Start: 2025-04-10 | End: 2025-04-13 | Stop reason: HOSPADM

## 2025-04-10 RX ORDER — POTASSIUM CHLORIDE 7.45 MG/ML
10 INJECTION INTRAVENOUS PRN
Status: ACTIVE | OUTPATIENT
Start: 2025-04-10 | End: 2025-04-12

## 2025-04-10 RX ORDER — PANTOPRAZOLE SODIUM 40 MG/1
40 TABLET, DELAYED RELEASE ORAL
Status: DISCONTINUED | OUTPATIENT
Start: 2025-04-11 | End: 2025-04-13 | Stop reason: HOSPADM

## 2025-04-10 RX ORDER — SODIUM CHLORIDE 0.9 % (FLUSH) 0.9 %
5-40 SYRINGE (ML) INJECTION EVERY 12 HOURS SCHEDULED
Status: DISCONTINUED | OUTPATIENT
Start: 2025-04-10 | End: 2025-04-13 | Stop reason: HOSPADM

## 2025-04-10 RX ORDER — ESCITALOPRAM OXALATE 10 MG/1
20 TABLET ORAL DAILY
Status: DISCONTINUED | OUTPATIENT
Start: 2025-04-11 | End: 2025-04-13 | Stop reason: HOSPADM

## 2025-04-10 RX ORDER — OXYCODONE AND ACETAMINOPHEN 5; 325 MG/1; MG/1
1 TABLET ORAL ONCE
Refills: 0 | Status: DISCONTINUED | OUTPATIENT
Start: 2025-04-10 | End: 2025-04-10

## 2025-04-10 RX ORDER — ONDANSETRON 4 MG/1
4 TABLET, ORALLY DISINTEGRATING ORAL ONCE
Status: COMPLETED | OUTPATIENT
Start: 2025-04-10 | End: 2025-04-10

## 2025-04-10 RX ORDER — MORPHINE SULFATE 2 MG/ML
2 INJECTION, SOLUTION INTRAMUSCULAR; INTRAVENOUS EVERY 6 HOURS PRN
Status: DISCONTINUED | OUTPATIENT
Start: 2025-04-10 | End: 2025-04-11

## 2025-04-10 RX ORDER — GUAIFENESIN/DEXTROMETHORPHAN 100-10MG/5
5 SYRUP ORAL EVERY 6 HOURS PRN
Status: DISCONTINUED | OUTPATIENT
Start: 2025-04-10 | End: 2025-04-11

## 2025-04-10 RX ORDER — OXYBUTYNIN CHLORIDE 10 MG/1
10 TABLET, EXTENDED RELEASE ORAL DAILY
Status: DISCONTINUED | OUTPATIENT
Start: 2025-04-11 | End: 2025-04-13 | Stop reason: HOSPADM

## 2025-04-10 RX ORDER — PROCHLORPERAZINE EDISYLATE 5 MG/ML
5 INJECTION INTRAMUSCULAR; INTRAVENOUS EVERY 6 HOURS PRN
Status: DISCONTINUED | OUTPATIENT
Start: 2025-04-10 | End: 2025-04-13 | Stop reason: HOSPADM

## 2025-04-10 RX ORDER — POLYETHYLENE GLYCOL 3350 17 G/17G
17 POWDER, FOR SOLUTION ORAL DAILY PRN
Status: DISCONTINUED | OUTPATIENT
Start: 2025-04-10 | End: 2025-04-13 | Stop reason: HOSPADM

## 2025-04-10 RX ADMIN — FENTANYL CITRATE 50 MCG: 50 INJECTION INTRAMUSCULAR; INTRAVENOUS at 17:15

## 2025-04-10 RX ADMIN — SODIUM CHLORIDE, PRESERVATIVE FREE 10 ML: 5 INJECTION INTRAVENOUS at 21:52

## 2025-04-10 RX ADMIN — DOXYCYCLINE HYCLATE 100 MG: 100 CAPSULE ORAL at 16:40

## 2025-04-10 RX ADMIN — MORPHINE SULFATE 8 MG: 4 INJECTION, SOLUTION INTRAMUSCULAR; INTRAVENOUS at 12:38

## 2025-04-10 RX ADMIN — KETOROLAC TROMETHAMINE 30 MG: 30 INJECTION, SOLUTION INTRAMUSCULAR at 17:12

## 2025-04-10 RX ADMIN — ENOXAPARIN SODIUM 40 MG: 100 INJECTION SUBCUTANEOUS at 22:06

## 2025-04-10 RX ADMIN — MORPHINE SULFATE 2 MG: 2 INJECTION, SOLUTION INTRAMUSCULAR; INTRAVENOUS at 21:43

## 2025-04-10 RX ADMIN — WATER 1000 MG: 1 INJECTION INTRAMUSCULAR; INTRAVENOUS; SUBCUTANEOUS at 17:17

## 2025-04-10 RX ADMIN — ONDANSETRON 4 MG: 4 TABLET, ORALLY DISINTEGRATING ORAL at 12:38

## 2025-04-10 ASSESSMENT — PAIN SCALES - GENERAL
PAINLEVEL_OUTOF10: 10
PAINLEVEL_OUTOF10: 10
PAINLEVEL_OUTOF10: 0
PAINLEVEL_OUTOF10: 10

## 2025-04-10 ASSESSMENT — PAIN DESCRIPTION - ORIENTATION
ORIENTATION: LEFT
ORIENTATION: RIGHT
ORIENTATION: RIGHT

## 2025-04-10 ASSESSMENT — LIFESTYLE VARIABLES
HOW MANY STANDARD DRINKS CONTAINING ALCOHOL DO YOU HAVE ON A TYPICAL DAY: PATIENT DOES NOT DRINK
HOW OFTEN DO YOU HAVE A DRINK CONTAINING ALCOHOL: NEVER

## 2025-04-10 ASSESSMENT — PAIN DESCRIPTION - DESCRIPTORS
DESCRIPTORS: ACHING
DESCRIPTORS: SHARP;STABBING

## 2025-04-10 ASSESSMENT — PAIN DESCRIPTION - LOCATION
LOCATION: BACK
LOCATION: CHEST
LOCATION: BACK

## 2025-04-10 ASSESSMENT — PAIN SCALES - WONG BAKER: WONGBAKER_NUMERICALRESPONSE: NO HURT

## 2025-04-10 NOTE — ED PROVIDER NOTES
Shared EDWARD-ED Attending Visit.  CC: No        [facility] EMERGENCY DEPARTMENT  EMERGENCY DEPARTMENT ENCOUNTER        Pt Name: Zayda Chauhan  MRN: [id number]  Birthdate 1965  Date of evaluation: 4/10/2025  Provider: MERCY Botello - ANGIE  PCP: Aaron Vanegas DO  Note Started: 7:01 PM EDT 4/10/25       I have seen and evaluated this patient with my supervising physician Arya Garcia MD.      CHIEF COMPLAINT       Chief Complaint   Patient presents with    Back Pain     Patient states she is having right sided upper back pain after coughing about 45 min PTA       HISTORY OF PRESENT ILLNESS: 1 or more Elements     History from : Patient    Limitations to history : None    Zayda Chauhan is a 59 y.o. female who presents to the emergency department complaining of right posterior and lateral rib cage pain.  This started about 45 minutes prior to arrival.  She states that she was leaning over and coughed and felt a very sharp pain in this area.  She has had constant pain here since.  She has not had any falls or trauma to this area.  She is in a lot of pain on my assessment.  She is unable to get comfortable.  She required assistance getting out of passenger side of vehicle on arrival.  Patient has had a constant cough for the past 2 weeks as well.  She states that she has taken a Z-Dennys and has not had any improvement in symptoms.  On assessment, patient is in no acute distress, nontoxic-appearing, respirations are easy and unlabored.  GCS is 15.  Moving all extremities well issue    Nursing Notes were all reviewed and agreed with or any disagreements were addressed in the HPI.    REVIEW OF SYSTEMS :      Review of Systems    Positives and Pertinent negatives as per HPI.     SURGICAL HISTORY     Past Surgical History:   Procedure Laterality Date    APPENDECTOMY      CHOLECYSTECTOMY      CHOLECYSTECTOMY, LAPAROSCOPIC N/A 07/06/2023    CHOLECYSTECTOMY LAPAROSCOPIC performed

## 2025-04-11 LAB
ANION GAP SERPL CALCULATED.3IONS-SCNC: 13 MMOL/L (ref 7–16)
BASOPHILS # BLD: 0.12 K/UL (ref 0–0.2)
BASOPHILS NFR BLD: 1 % (ref 0–2)
BUN SERPL-MCNC: 19 MG/DL (ref 6–20)
CALCIUM SERPL-MCNC: 9.2 MG/DL (ref 8.6–10.2)
CHLORIDE SERPL-SCNC: 102 MMOL/L (ref 98–107)
CO2 SERPL-SCNC: 22 MMOL/L (ref 22–29)
CREAT SERPL-MCNC: 0.9 MG/DL (ref 0.5–1)
EOSINOPHIL # BLD: 0.19 K/UL (ref 0.05–0.5)
EOSINOPHILS RELATIVE PERCENT: 2 % (ref 0–6)
ERYTHROCYTE [DISTWIDTH] IN BLOOD BY AUTOMATED COUNT: 14.2 % (ref 11.5–15)
GFR, ESTIMATED: 80 ML/MIN/1.73M2
GLUCOSE SERPL-MCNC: 106 MG/DL (ref 74–99)
HCT VFR BLD AUTO: 39.9 % (ref 34–48)
HGB BLD-MCNC: 12.7 G/DL (ref 11.5–15.5)
IMM GRANULOCYTES # BLD AUTO: 0.09 K/UL (ref 0–0.58)
IMM GRANULOCYTES NFR BLD: 1 % (ref 0–5)
LYMPHOCYTES NFR BLD: 2.55 K/UL (ref 1.5–4)
LYMPHOCYTES RELATIVE PERCENT: 27 % (ref 20–42)
MCH RBC QN AUTO: 29.4 PG (ref 26–35)
MCHC RBC AUTO-ENTMCNC: 31.8 G/DL (ref 32–34.5)
MCV RBC AUTO: 92.4 FL (ref 80–99.9)
MONOCYTES NFR BLD: 0.88 K/UL (ref 0.1–0.95)
MONOCYTES NFR BLD: 9 % (ref 2–12)
NEUTROPHILS NFR BLD: 59 % (ref 43–80)
NEUTS SEG NFR BLD: 5.59 K/UL (ref 1.8–7.3)
PLATELET # BLD AUTO: 330 K/UL (ref 130–450)
PMV BLD AUTO: 11.5 FL (ref 7–12)
POTASSIUM SERPL-SCNC: 4.3 MMOL/L (ref 3.5–5)
RBC # BLD AUTO: 4.32 M/UL (ref 3.5–5.5)
SODIUM SERPL-SCNC: 137 MMOL/L (ref 132–146)
WBC OTHER # BLD: 9.4 K/UL (ref 4.5–11.5)

## 2025-04-11 PROCEDURE — 6370000000 HC RX 637 (ALT 250 FOR IP): Performed by: STUDENT IN AN ORGANIZED HEALTH CARE EDUCATION/TRAINING PROGRAM

## 2025-04-11 PROCEDURE — 2500000003 HC RX 250 WO HCPCS: Performed by: NURSE PRACTITIONER

## 2025-04-11 PROCEDURE — 2140000000 HC CCU INTERMEDIATE R&B

## 2025-04-11 PROCEDURE — 6370000000 HC RX 637 (ALT 250 FOR IP): Performed by: NURSE PRACTITIONER

## 2025-04-11 PROCEDURE — 6360000002 HC RX W HCPCS: Performed by: NURSE PRACTITIONER

## 2025-04-11 PROCEDURE — 80048 BASIC METABOLIC PNL TOTAL CA: CPT

## 2025-04-11 PROCEDURE — 36415 COLL VENOUS BLD VENIPUNCTURE: CPT

## 2025-04-11 PROCEDURE — 85025 COMPLETE CBC W/AUTO DIFF WBC: CPT

## 2025-04-11 PROCEDURE — 99221 1ST HOSP IP/OBS SF/LOW 40: CPT | Performed by: STUDENT IN AN ORGANIZED HEALTH CARE EDUCATION/TRAINING PROGRAM

## 2025-04-11 PROCEDURE — 2580000003 HC RX 258: Performed by: NURSE PRACTITIONER

## 2025-04-11 RX ORDER — BENZONATATE 100 MG/1
100 CAPSULE ORAL EVERY 8 HOURS PRN
Status: DISCONTINUED | OUTPATIENT
Start: 2025-04-11 | End: 2025-04-13 | Stop reason: HOSPADM

## 2025-04-11 RX ORDER — LIDOCAINE 4 G/G
1 PATCH TOPICAL DAILY
Status: DISCONTINUED | OUTPATIENT
Start: 2025-04-11 | End: 2025-04-13 | Stop reason: HOSPADM

## 2025-04-11 RX ORDER — MORPHINE SULFATE 2 MG/ML
2 INJECTION, SOLUTION INTRAMUSCULAR; INTRAVENOUS EVERY 4 HOURS PRN
Status: DISCONTINUED | OUTPATIENT
Start: 2025-04-11 | End: 2025-04-13 | Stop reason: HOSPADM

## 2025-04-11 RX ORDER — MORPHINE SULFATE 2 MG/ML
1 INJECTION, SOLUTION INTRAMUSCULAR; INTRAVENOUS EVERY 4 HOURS PRN
Status: DISCONTINUED | OUTPATIENT
Start: 2025-04-11 | End: 2025-04-13 | Stop reason: HOSPADM

## 2025-04-11 RX ORDER — TRAMADOL HYDROCHLORIDE 50 MG/1
25 TABLET ORAL EVERY 6 HOURS PRN
Status: DISCONTINUED | OUTPATIENT
Start: 2025-04-11 | End: 2025-04-13 | Stop reason: HOSPADM

## 2025-04-11 RX ADMIN — MORPHINE SULFATE 2 MG: 2 INJECTION, SOLUTION INTRAMUSCULAR; INTRAVENOUS at 09:43

## 2025-04-11 RX ADMIN — TRAMADOL HYDROCHLORIDE 25 MG: 50 TABLET, FILM COATED ORAL at 12:13

## 2025-04-11 RX ADMIN — BENZONATATE 100 MG: 100 CAPSULE ORAL at 12:14

## 2025-04-11 RX ADMIN — DOXYCYCLINE 100 MG: 100 INJECTION, POWDER, LYOPHILIZED, FOR SOLUTION INTRAVENOUS at 03:35

## 2025-04-11 RX ADMIN — ENOXAPARIN SODIUM 40 MG: 100 INJECTION SUBCUTANEOUS at 09:45

## 2025-04-11 RX ADMIN — PANTOPRAZOLE SODIUM 40 MG: 40 TABLET, DELAYED RELEASE ORAL at 05:32

## 2025-04-11 RX ADMIN — BENZONATATE 100 MG: 100 CAPSULE ORAL at 03:31

## 2025-04-11 RX ADMIN — OXYBUTYNIN CHLORIDE 10 MG: 10 TABLET, EXTENDED RELEASE ORAL at 12:19

## 2025-04-11 RX ADMIN — SODIUM CHLORIDE, PRESERVATIVE FREE 10 ML: 5 INJECTION INTRAVENOUS at 09:45

## 2025-04-11 RX ADMIN — WATER 1000 MG: 1 INJECTION INTRAMUSCULAR; INTRAVENOUS; SUBCUTANEOUS at 16:43

## 2025-04-11 RX ADMIN — ESCITALOPRAM OXALATE 20 MG: 10 TABLET ORAL at 09:45

## 2025-04-11 RX ADMIN — TRAMADOL HYDROCHLORIDE 25 MG: 50 TABLET, FILM COATED ORAL at 03:38

## 2025-04-11 RX ADMIN — MORPHINE SULFATE 2 MG: 2 INJECTION, SOLUTION INTRAMUSCULAR; INTRAVENOUS at 01:43

## 2025-04-11 RX ADMIN — SODIUM CHLORIDE, PRESERVATIVE FREE 10 ML: 5 INJECTION INTRAVENOUS at 19:50

## 2025-04-11 RX ADMIN — TRAMADOL HYDROCHLORIDE 25 MG: 50 TABLET, FILM COATED ORAL at 18:17

## 2025-04-11 RX ADMIN — MORPHINE SULFATE 2 MG: 2 INJECTION, SOLUTION INTRAMUSCULAR; INTRAVENOUS at 19:51

## 2025-04-11 RX ADMIN — MORPHINE SULFATE 1 MG: 2 INJECTION, SOLUTION INTRAMUSCULAR; INTRAVENOUS at 14:22

## 2025-04-11 RX ADMIN — MORPHINE SULFATE 2 MG: 2 INJECTION, SOLUTION INTRAMUSCULAR; INTRAVENOUS at 05:48

## 2025-04-11 RX ADMIN — SODIUM CHLORIDE, PRESERVATIVE FREE 10 ML: 5 INJECTION INTRAVENOUS at 16:44

## 2025-04-11 RX ADMIN — DOXYCYCLINE 100 MG: 100 INJECTION, POWDER, LYOPHILIZED, FOR SOLUTION INTRAVENOUS at 16:57

## 2025-04-11 ASSESSMENT — PAIN DESCRIPTION - ORIENTATION
ORIENTATION: RIGHT

## 2025-04-11 ASSESSMENT — PAIN DESCRIPTION - DESCRIPTORS
DESCRIPTORS: ACHING;DISCOMFORT;DULL
DESCRIPTORS: ACHING;DISCOMFORT;DULL
DESCRIPTORS: ACHING;STABBING;SORE
DESCRIPTORS: ACHING;DISCOMFORT;DULL
DESCRIPTORS: ACHING;TENDER;SHOOTING
DESCRIPTORS: ACHING;TENDER;SORE
DESCRIPTORS: ACHING;DULL;SHARP
DESCRIPTORS: ACHING

## 2025-04-11 ASSESSMENT — PAIN SCALES - GENERAL
PAINLEVEL_OUTOF10: 10
PAINLEVEL_OUTOF10: 8
PAINLEVEL_OUTOF10: 8
PAINLEVEL_OUTOF10: 6
PAINLEVEL_OUTOF10: 7
PAINLEVEL_OUTOF10: 10
PAINLEVEL_OUTOF10: 0
PAINLEVEL_OUTOF10: 8
PAINLEVEL_OUTOF10: 3
PAINLEVEL_OUTOF10: 8
PAINLEVEL_OUTOF10: 2
PAINLEVEL_OUTOF10: 0

## 2025-04-11 ASSESSMENT — PAIN - FUNCTIONAL ASSESSMENT
PAIN_FUNCTIONAL_ASSESSMENT: ACTIVITIES ARE NOT PREVENTED
PAIN_FUNCTIONAL_ASSESSMENT: PREVENTS OR INTERFERES WITH ALL ACTIVE AND SOME PASSIVE ACTIVITIES

## 2025-04-11 ASSESSMENT — PAIN DESCRIPTION - ONSET: ONSET: ON-GOING

## 2025-04-11 ASSESSMENT — PAIN DESCRIPTION - LOCATION
LOCATION: BACK;FLANK
LOCATION: FLANK
LOCATION: BACK
LOCATION: CHEST
LOCATION: BACK;FLANK
LOCATION: FLANK
LOCATION: FLANK
LOCATION: BACK;FLANK

## 2025-04-11 ASSESSMENT — PAIN DESCRIPTION - FREQUENCY: FREQUENCY: CONTINUOUS

## 2025-04-11 ASSESSMENT — PAIN DESCRIPTION - PAIN TYPE: TYPE: ACUTE PAIN

## 2025-04-11 NOTE — H&P
[facility]   History and Physical      CHIEF COMPLAINT:  Back Pain (Patient states she is having right sided upper back pain after coughing about 45 min PTA)      Reason for Admission:  Pneumonia due to organism [J18.9]  Closed fracture of one rib of right side, initial encounter [S22.31XA]  Pneumonia of left lung due to infectious organism, unspecified part of lung [J18.9]    History Obtained From:  patient, family member - daughter, electronic medical record    HISTORY OF PRESENT ILLNESS:        The patient is a 59 y.o. female of Aaron Vanegas DO medical history of chronic back pain presents right-sided upper back pain and cough.  At the ER, she was afebrile, vitals were within normal limits.  Labs show normal renal function, transaminitis with leukocytosis BC of 12.8>9.4.  CT chest showed patchy left lung opacity concerning for PNA  There is also fracture of right ninth lateral rib.    On exam, patient laying in bed able to sit up.  States she has been coughing for the past 2 weeks and then noticed pain on the right side of her back.    Given history of rib fracture, and her current state of pain obtained trauma consult.  Patient agreeable.  Her pain is worse with cough.  Taking deep breaths provokes cough which causes pain.    All labs personally reviewed   All imaging personally reviewed     Past Medical History:        Diagnosis Date    Headache(784.0)     migraine     Past Surgical History:        Procedure Laterality Date    APPENDECTOMY      CHOLECYSTECTOMY      CHOLECYSTECTOMY, LAPAROSCOPIC N/A 07/06/2023    CHOLECYSTECTOMY LAPAROSCOPIC performed by Angel Mackenzie MD at [facility]    COLONOSCOPY      cologuard1/12/2024 Negative    HIP CLOSED REDUCTION  [id number]    RIGHT WITH PERCUTANEOUS PINNING    HYSTERECTOMY (CERVIX STATUS UNKNOWN)  2008         Medications Prior to Admission:    Medications Prior to Admission: albuterol sulfate HFA (VENTOLIN HFA) 108 (90 Base) MCG/ACT inhaler, Inhale

## 2025-04-11 NOTE — ED NOTES
Educated on need for sputum specimen.  Cup provided.  Patient stated her cough is non-productive at this time.

## 2025-04-11 NOTE — PROGRESS NOTES
Spoke with Dr. Sauer while on unit who states that the pt cannot downgrade to a general floor at this time.

## 2025-04-11 NOTE — ED NOTES
Perfect serve sent to Rosa Sue NP.  Updated on patient's pain and requesting additional  analgesic.

## 2025-04-11 NOTE — ACP (ADVANCE CARE PLANNING)
Advance Care Planning   The patient has the following advanced directives on file:  Advance Directives       Power of  Living Will ACP-Advance Directive ACP-Power of     Not on File Not on File Not on File Not on File            The patient has appointed the following active healthcare agents:    Primary Decision Maker: Sana Martin - Child - [phone]    Primary Decision Maker: Hilda Chauhan Child - [phone]    The Patient has the following current code status:    Code Status: Full Code      Diann Landry, LORETTA  4/11/2025

## 2025-04-11 NOTE — ED NOTES
ED TO INPATIENT SBAR HANDOFF     Patient Name: Zayda Chauhan   Preferred Name: Zayda  : 1965  59 y.o.             Family/Caregiver Present: yes   Code Status Order: Full Code  PO Status:[unfilled]  Telemetry Order:   C-SSRS:    Sitter no   Restraints:    Sepsis Risk Score      Situation:  Chief Complaint   Patient presents with    Back Pain     Patient states she is having right sided upper back pain after coughing about 45 min PTA     Brief Description of Patient's Condition: Cough caused severe right sided flank/back pain.  Xray revealed fractured rib.     Mental Status: oriented and alert  Arrived from: Home  Abnormal labs:   Abnormal Labs Reviewed   CBC WITH AUTO DIFFERENTIAL - Abnormal; Notable for the following components:       Result Value    WBC 12.8 (*)     Lymphocytes % 15 (*)     Neutrophils Absolute 9.78 (*)     All other components within normal limits   COMPREHENSIVE METABOLIC PANEL - Abnormal; Notable for the following components:    CO2 19 (*)     Anion Gap 17 (*)     Glucose 120 (*)     Alkaline Phosphatase 108 (*)     ALT 55 (*)     AST 63 (*)     All other components within normal limits        Background:  Allergies: No Known Allergies  History:   Past Medical History:   Diagnosis Date    Headache(784.0)     migraine        Assessment:  Vitals/MEWS: MEWS Score: 1  Level of Consciousness: Alert (0)   Vitals:    04/10/25 2047 04/10/25 2143 04/10/25 2213 25 0143   BP: 129/79      Pulse: 78      Resp: 18 18 19 18   Temp: 97.5 °F (36.4 °C)      TempSrc: Oral      SpO2: 91%        Cardiac Rhythm:       Deterioration Index:  O2 Flow Rate:    O2 Device: O2 Device: None (Room air)  Critical Lab Results: [unfilled]  Cultures:   NIH Score: NIH     Active LDA's:   Patient Lines/Drains/Airways Status       Active LDAs       Name Placement date Placement time Site Days    Peripheral IV 04/10/25 Left;Posterior Hand 04/10/25  1711  Hand  less than 1    Incision 23 Abdomen Medial;Upper

## 2025-04-11 NOTE — PROGRESS NOTES
4 Eyes Skin Assessment     NAME:  Zayda Chauhan  YOB: 1965  MEDICAL RECORD NUMBER:  [id number]    The patient is being assessed for  Admission    I agree that at least one RN has performed a thorough Head to Toe Skin Assessment on the patient. ALL assessment sites listed below have been assessed.      Areas assessed by both nurses:    Head, Face, Ears, Shoulders, Back, Chest, Arms, Elbows, Hands, Sacrum. Buttock, Coccyx, Ischium, Legs. Feet and Heels, and Under Medical Devices         Does the Patient have a Wound? No noted wound(s)       Abiel Prevention initiated by RN: No  Wound Care Orders initiated by RN: No    Pressure Injury (Stage 3,4, Unstageable, DTI, NWPT, and Complex wounds) if present, place Wound referral order by RN under : No    New Ostomies, if present place, Ostomy referral order under : No     Nurse 1 eSignature: Electronically signed by Tami East RN on 4/11/25 at 6:39 AM EDT    **SHARE this note so that the co-signing nurse can place an eSignature**    Nurse 2 eSignature: Electronically signed by Morena Singh RN on 4/11/25 at 6:40 AM EDT

## 2025-04-11 NOTE — PLAN OF CARE
Problem: Safety - Adult  Goal: Free from fall injury  4/11/2025 0400 by Tami East RN  Outcome: Progressing  4/11/2025 0112 by Gilligan, Melissa, RN  Outcome: Progressing     Problem: Pain  Goal: Verbalizes/displays adequate comfort level or baseline comfort level  4/11/2025 0400 by Tami East RN  Outcome: Progressing  4/11/2025 0112 by Gilligan, Melissa, RN  Outcome: Progressing     Problem: Discharge Planning  Goal: Discharge to home or other facility with appropriate resources  Outcome: Progressing     Problem: Respiratory - Adult  Goal: Achieves optimal ventilation and oxygenation  Outcome: Progressing     Problem: Skin/Tissue Integrity - Adult  Goal: Skin integrity remains intact  Outcome: Progressing

## 2025-04-11 NOTE — CONSULTS
TRAUMA CONSULT  Attending/Surgical Resident/Advance Practice Nurse  4/11/2025  2:10 PM    PRIMARY SURVEY    CHIEF COMPLAINT:  Trauma consult.    Injury occurred earlier today.  Patient states she had a coughing fit and then developed acute right-sided chest pain.  She went to ED and was had a CT chest which was concerning for a right ninth rib fracture.  Patient denies any trauma, denies falling, denies hitting her head.  Denies any loss conscious.  Does not smoke.  Denies any history of COPD.  On room air.  SMI 2500.  The patient is admitted for pneumonia.  Patient is on any blood thinning medication.    Trauma surgery was consulted for further evaluation.      AIRWAY:   Airway Normal    EMS ETT Absent  Noisy respirations Absent  Retractions: Absent  Vomiting/bleeding: Absent    BREATHING:    Midaxillary breath sound left:  Normal  Midaxillary breath sound right:  Normal    Cough sound intensity:  good    FiO2:  Room air    SMI 2000 mL.     CIRCULATION:   Femerol pulse intensity: Strong  Palpebral conjunctiva: Red    Vitals:    04/11/25 1130   BP: 108/86   Pulse: 75   Resp: 18   Temp: 98 °F (36.7 °C)   SpO2: 96%       Vitals:    04/11/25 0408 04/11/25 0618 04/11/25 0745 04/11/25 1130   BP:   (!) 88/62 108/86   Pulse:   75 75   Resp: 18 18 18 18   Temp:   97.9 °F (36.6 °C) 98 °F (36.7 °C)   TempSrc:   Oral Oral   SpO2:   94% 96%   Weight:       Height:            FAST EXAM: Deferred    Central Nervous System    GCS Initial 15 minutes   Eye  Motor  Verbal 4 - Opens eyes on own  6 - Follows simple motor commands  5 - Alert and oriented 4 - Opens eyes on own  6 - Follows simple motor commands  5 - Alert and oriented     Neuromuscular blockade: No  Pupil size:  Left 4 mm    Right 4 mm  Pupil reaction: Yes    Wiggles fingers: Left Yes Right Yes  Wiggles toes: Left Yes   Right Yes    Hand grasp:   Left  Present      Right  Present  Plantar flexion: Left  Present      Right   Present    Loss of consciousness:  No  56 YO M with a history of tobacco abuse who presented to [facility] with 1 week of chest pain and found to have NSTEMI where he progressed to cardiogenic shock with hypoxic respiratory failure from pulmonary edema requiring intubation. LHC performed and revealed severe 3v CAD and TTE revealed LVEF 20-25%. IABP was placed and he was extubated and weaned off pressors before undergoing 3v CABG and MVR on 5/10 by Dr. Coles with post-operative course complicated by severe bleeding and mixed cardiogenic/hypovolemic shock requiring peripheral VA ECMO cannulation (RFA/RFV). He was unable to be weaned from ECMO support prompting placement of Impella 5.5 for LV venting 5/13 and transferred to [facility] 5/16 for further management. His course has also been notable for SUSIE requiring CVVH, pAF/AFl , NSVT, recurrent epistaxis requiring cessation of anticoagulation, and high fevers with sputum culture positive for Enterobacter and negative blood cultures. and LVAD evaluation was launched.     He successfully underwent ECMO decannulation on 5/30 but has been dependent on pressors despite adequate cardiac output and Impella flow likely due to baseline vasoplegia. His RV function is normal on CROW. He underwent CROW/DCCV for AFl on 5/28 but remains in AF/AFl despite amiodarone.     He is not a transplant candidate due to critical illness and tobacco use. He is too critically ill and deconditioned to tolerate successful LVAD surgery. Prognosis is guarded and this has been discussed with the family. LVAD support will likely not alleviate pressor requirements given his current hemodynamic state.     Original plan was for slow Impella wean but on 6/7 developed leaking from Impella cassette and therefore underwent more urgent Impella removal on 6/8 along with repeat CROW/DCCV. He was vasoplegic afterwards and required cyanokit. He had high/normal cardiac output and mildly elevated filling pressures off MCS though continues to be dependent on low dose pressors. Failed extubation trial, s/p tracheostomy. Sputum growing enterobacter/serratia, s/p course of antibiotics.    Hgb was downtrending, now remains stable s/p 2 units PRBC. No obvious source of bleeding.  Tolerating nocturnal CRRT. He has a stable leukocytosis without fever. Ongoing trach collar weans. Now tolerated intermittent HD     Hemodynamics:  6/16/2022: norepi .12 mcg/kg/min, vaso 0.1 u/min epi 0.05 mcg/kg/min, CVP 8 Mv 78  6/13/2022: norepi 0.16, vaso 0.1: CVP 6 Central Sat 70.7 (CO/CI 7.7/3.2)  6/9/22: norepi .05, vaso 0.1,  CVP 5, PA 41/15/25 MvO2 70.2 (CI 3.4)  6/8/22: Impella 5.5 at P2 vaso 0.1mcg/kg/min and norepi 0.03: CVP 11 PA 42/19/30 MVO2 74%  6/5/22: Imeplla 5.5 @P5 and vaso 0.1 mcg/kg/min HR 76, CVP 16, PA 45/20/30, A-line MAP 77, MVO2 71.8%  5/15/22: V-A ECMO 3000 rpm Flow 3-3.2 lpm, impella 5.5 @ P6 Flows 3.5 lpm,  5 mcg/kg/min, levo 0.04 mcg/kg/min, vas0 0.02 mcg/kg/min HR 79 CVP 9 PA 39/16/25 PCWP 12 A-line MAP 65 SVO2 94.1%  5/14/22: V-A ECMO 3000 rpm  Flow 3-3.1 lpm, Impella 5.5 @ P6 Flows 3.6 lpm,  5 mcg/kg/min, levo 0.05 mcg/kg/min, vaso 0.04 mcg/kg/min HR 93(A-V paced) CVP 14 PA 45/25/32 PCWP not obtained A-line 98/77/80 SVO2 84.3%  5/13/22: V-A ECMO 3600 rpm Flow 4.4 lpm IABP 1:1  5 mcg/kg/min HR 68, RA 13 PA 31/16/22 W 12 A line 115/55/81 SVO2  5/12/22: V-A ECMO 3600 rpm Flow 4.5 lpm IABP 1:1  5 mcg/kg/min HR 86 RA 7 PA 26/12/18 W 9 A line brachial 103/56/70 SVO2 87.7%  5/11/22: V-A ECMO 4200 rpm Flow 5.6 lpm IABP 1:1  5 mcg/kg/min HR 80 (SR) RA 13 PA 29/15/20 W not obtained A line R brachial 96/66 (IABP standby) SVO2 91%   5/10/22: V-A ECMO 3700 rpm flows 4.5-4.7 lpm, IABP 1:1, Epi 0.013 mcg/kg/min, levo 0.11 mcg/kg/min, vaso 0.05 u/min,  5 mcg/kg/min. HR 79 (AV paced), CVP 10, PA 19/12/16 W not obtained A-line (Right brachial) 109/63, SVO2 72.2%. No pulsatility on a line when IABP is on standby.    5/6/22: HR 89, IABP MAP 81, augmented diastolic 106, CVP 8, PAP 63/26/41, TD CI 2.5  5/5/22: Dobutamine 3mcg/kg/min, Levophed 0.04mcg/kg/min - , IABP MAP 93, augmented diastolic 98, CVP 8, PAP 59/37/47, MVO2 from 4/4 was 72%, TD CI 3.3, Pedrito CO/CI 7.8/3.1.    56 YO M with a history of tobacco abuse who presented to [facility] with 1 week of chest pain and found to have NSTEMI where he progressed to cardiogenic shock with hypoxic respiratory failure from pulmonary edema requiring intubation. LHC performed and revealed severe 3v CAD and TTE revealed LVEF 20-25%. IABP was placed and he was extubated and weaned off pressors before undergoing 3v CABG and MVR on 5/10 by Dr. Coles with post-operative course complicated by severe bleeding and mixed cardiogenic/hypovolemic shock requiring peripheral VA ECMO cannulation (RFA/RFV). He was unable to be weaned from ECMO support prompting placement of Impella 5.5 for LV venting 5/13 and transferred to [facility] 5/16 for further management. His course has also been notable for SUSIE requiring CVVH, persistent pAF/Aflu despite DCCV (5/28 and 6/28), NSVT, recurrent epistaxis requiring cessation of anticoagulation, and high fevers with sputum culture positive for Enterobacter/Serratia. Failed extubation, s/p tracheostomy.     He successfully underwent ECMO decannulation on 5/30 and then urgent Impella removal 6/8 due to leaking from cassette. Despite high/normal cardiac output off MCS, persistent vasoplegia of unclear etiology. TTE 7/12 with LVEF 30-35% (R side not well visualized). He has been weaned off pressor support since 7/27, currently on Midodrine and Droxidopa. Transitioned from CRRT to iHD 7/25.     He is not a transplant candidate due to critical illness and tobacco use. He is too critically ill and deconditioned to tolerate successful LVAD surgery. Prognosis is guarded which has been discussed with the family but appears clinically improved in recent days.     Cardiac Studies  7/12 TTE: LVIDd 5.8 cm, LVEF 30-35%, suboptimal visualization of right heart, bio MVR with mean gradient of 6.4 mmHg at 90 bpm  6/08 CROW intraop with Impella: LVEF 20-25%, RVE with decreased systolic function, mod dilated LA, normal RA, bio MVR, min TR

## 2025-04-12 LAB
ANION GAP SERPL CALCULATED.3IONS-SCNC: 14 MMOL/L (ref 7–16)
BASOPHILS # BLD: 0.06 K/UL (ref 0–0.2)
BASOPHILS NFR BLD: 1 % (ref 0–2)
BUN SERPL-MCNC: 17 MG/DL (ref 6–20)
CALCIUM SERPL-MCNC: 9.3 MG/DL (ref 8.6–10.2)
CHLORIDE SERPL-SCNC: 105 MMOL/L (ref 98–107)
CO2 SERPL-SCNC: 22 MMOL/L (ref 22–29)
CREAT SERPL-MCNC: 0.7 MG/DL (ref 0.5–1)
EOSINOPHIL # BLD: 0.12 K/UL (ref 0.05–0.5)
EOSINOPHILS RELATIVE PERCENT: 1 % (ref 0–6)
ERYTHROCYTE [DISTWIDTH] IN BLOOD BY AUTOMATED COUNT: 14 % (ref 11.5–15)
GFR, ESTIMATED: >90 ML/MIN/1.73M2
GLUCOSE SERPL-MCNC: 112 MG/DL (ref 74–99)
HCT VFR BLD AUTO: 35.7 % (ref 34–48)
HGB BLD-MCNC: 11.4 G/DL (ref 11.5–15.5)
IMM GRANULOCYTES # BLD AUTO: 0.06 K/UL (ref 0–0.58)
IMM GRANULOCYTES NFR BLD: 1 % (ref 0–5)
L PNEUMO1 AG UR QL IA.RAPID: NEGATIVE
LYMPHOCYTES NFR BLD: 1.36 K/UL (ref 1.5–4)
LYMPHOCYTES RELATIVE PERCENT: 16 % (ref 20–42)
MCH RBC QN AUTO: 29.2 PG (ref 26–35)
MCHC RBC AUTO-ENTMCNC: 31.9 G/DL (ref 32–34.5)
MCV RBC AUTO: 91.5 FL (ref 80–99.9)
MONOCYTES NFR BLD: 0.6 K/UL (ref 0.1–0.95)
MONOCYTES NFR BLD: 7 % (ref 2–12)
NEUTROPHILS NFR BLD: 74 % (ref 43–80)
NEUTS SEG NFR BLD: 6.3 K/UL (ref 1.8–7.3)
PLATELET # BLD AUTO: 265 K/UL (ref 130–450)
PMV BLD AUTO: 11.5 FL (ref 7–12)
POTASSIUM SERPL-SCNC: 4.2 MMOL/L (ref 3.5–5)
RBC # BLD AUTO: 3.9 M/UL (ref 3.5–5.5)
S PNEUM AG SPEC QL: NEGATIVE
SODIUM SERPL-SCNC: 141 MMOL/L (ref 132–146)
SPECIMEN SOURCE: NORMAL
WBC OTHER # BLD: 8.5 K/UL (ref 4.5–11.5)

## 2025-04-12 PROCEDURE — 36415 COLL VENOUS BLD VENIPUNCTURE: CPT

## 2025-04-12 PROCEDURE — 80048 BASIC METABOLIC PNL TOTAL CA: CPT

## 2025-04-12 PROCEDURE — 6360000002 HC RX W HCPCS: Performed by: NURSE PRACTITIONER

## 2025-04-12 PROCEDURE — 87899 AGENT NOS ASSAY W/OPTIC: CPT

## 2025-04-12 PROCEDURE — 2500000003 HC RX 250 WO HCPCS: Performed by: NURSE PRACTITIONER

## 2025-04-12 PROCEDURE — 6370000000 HC RX 637 (ALT 250 FOR IP): Performed by: NURSE PRACTITIONER

## 2025-04-12 PROCEDURE — 87070 CULTURE OTHR SPECIMN AEROBIC: CPT

## 2025-04-12 PROCEDURE — 2580000003 HC RX 258: Performed by: NURSE PRACTITIONER

## 2025-04-12 PROCEDURE — 6370000000 HC RX 637 (ALT 250 FOR IP): Performed by: STUDENT IN AN ORGANIZED HEALTH CARE EDUCATION/TRAINING PROGRAM

## 2025-04-12 PROCEDURE — 85025 COMPLETE CBC W/AUTO DIFF WBC: CPT

## 2025-04-12 PROCEDURE — 2140000000 HC CCU INTERMEDIATE R&B

## 2025-04-12 PROCEDURE — 87449 NOS EACH ORGANISM AG IA: CPT

## 2025-04-12 PROCEDURE — 87205 SMEAR GRAM STAIN: CPT

## 2025-04-12 RX ORDER — BENZONATATE 100 MG/1
100 CAPSULE ORAL EVERY 8 HOURS PRN
Qty: 21 CAPSULE | Refills: 0 | Status: SHIPPED | OUTPATIENT
Start: 2025-04-12 | End: 2025-04-13 | Stop reason: HOSPADM

## 2025-04-12 RX ORDER — DOXYCYCLINE HYCLATE 100 MG
100 TABLET ORAL 2 TIMES DAILY
Qty: 6 TABLET | Refills: 0 | Status: SHIPPED | OUTPATIENT
Start: 2025-04-12 | End: 2025-04-15

## 2025-04-12 RX ORDER — GUAIFENESIN/DEXTROMETHORPHAN 100-10MG/5
5 SYRUP ORAL EVERY 4 HOURS PRN
Status: DISCONTINUED | OUTPATIENT
Start: 2025-04-12 | End: 2025-04-13 | Stop reason: HOSPADM

## 2025-04-12 RX ORDER — METHOCARBAMOL 500 MG/1
500 TABLET, FILM COATED ORAL 4 TIMES DAILY
Status: DISCONTINUED | OUTPATIENT
Start: 2025-04-12 | End: 2025-04-13 | Stop reason: HOSPADM

## 2025-04-12 RX ORDER — CEFDINIR 300 MG/1
300 CAPSULE ORAL 2 TIMES DAILY
Qty: 6 CAPSULE | Refills: 0 | Status: SHIPPED | OUTPATIENT
Start: 2025-04-12 | End: 2025-04-15

## 2025-04-12 RX ORDER — LIDOCAINE 4 G/G
1 PATCH TOPICAL DAILY
Qty: 5 EACH | Refills: 0 | Status: SHIPPED | OUTPATIENT
Start: 2025-04-13 | End: 2025-04-16

## 2025-04-12 RX ADMIN — ESCITALOPRAM OXALATE 20 MG: 10 TABLET ORAL at 08:26

## 2025-04-12 RX ADMIN — MORPHINE SULFATE 2 MG: 2 INJECTION, SOLUTION INTRAMUSCULAR; INTRAVENOUS at 09:57

## 2025-04-12 RX ADMIN — SODIUM CHLORIDE, PRESERVATIVE FREE 10 ML: 5 INJECTION INTRAVENOUS at 08:33

## 2025-04-12 RX ADMIN — METHOCARBAMOL 500 MG: 500 TABLET ORAL at 20:03

## 2025-04-12 RX ADMIN — METHOCARBAMOL 500 MG: 500 TABLET ORAL at 11:18

## 2025-04-12 RX ADMIN — BENZONATATE 100 MG: 100 CAPSULE ORAL at 20:03

## 2025-04-12 RX ADMIN — TRAMADOL HYDROCHLORIDE 25 MG: 50 TABLET, FILM COATED ORAL at 20:03

## 2025-04-12 RX ADMIN — TRAMADOL HYDROCHLORIDE 25 MG: 50 TABLET, FILM COATED ORAL at 04:31

## 2025-04-12 RX ADMIN — WATER 1000 MG: 1 INJECTION INTRAMUSCULAR; INTRAVENOUS; SUBCUTANEOUS at 17:25

## 2025-04-12 RX ADMIN — OXYBUTYNIN CHLORIDE 10 MG: 10 TABLET, EXTENDED RELEASE ORAL at 08:27

## 2025-04-12 RX ADMIN — MORPHINE SULFATE 2 MG: 2 INJECTION, SOLUTION INTRAMUSCULAR; INTRAVENOUS at 05:33

## 2025-04-12 RX ADMIN — PANTOPRAZOLE SODIUM 40 MG: 40 TABLET, DELAYED RELEASE ORAL at 05:35

## 2025-04-12 RX ADMIN — DOXYCYCLINE 100 MG: 100 INJECTION, POWDER, LYOPHILIZED, FOR SOLUTION INTRAVENOUS at 15:51

## 2025-04-12 RX ADMIN — GUAIFENESIN SYRUP AND DEXTROMETHORPHAN 5 ML: 100; 10 SYRUP ORAL at 15:41

## 2025-04-12 RX ADMIN — MORPHINE SULFATE 2 MG: 2 INJECTION, SOLUTION INTRAMUSCULAR; INTRAVENOUS at 18:05

## 2025-04-12 RX ADMIN — METHOCARBAMOL 500 MG: 500 TABLET ORAL at 17:18

## 2025-04-12 RX ADMIN — ENOXAPARIN SODIUM 40 MG: 100 INJECTION SUBCUTANEOUS at 08:26

## 2025-04-12 RX ADMIN — MORPHINE SULFATE 2 MG: 2 INJECTION, SOLUTION INTRAMUSCULAR; INTRAVENOUS at 22:02

## 2025-04-12 RX ADMIN — TRAMADOL HYDROCHLORIDE 25 MG: 50 TABLET, FILM COATED ORAL at 11:17

## 2025-04-12 RX ADMIN — MORPHINE SULFATE 2 MG: 2 INJECTION, SOLUTION INTRAMUSCULAR; INTRAVENOUS at 13:57

## 2025-04-12 RX ADMIN — DOXYCYCLINE 100 MG: 100 INJECTION, POWDER, LYOPHILIZED, FOR SOLUTION INTRAVENOUS at 04:31

## 2025-04-12 RX ADMIN — GUAIFENESIN SYRUP AND DEXTROMETHORPHAN 5 ML: 100; 10 SYRUP ORAL at 11:18

## 2025-04-12 RX ADMIN — GUAIFENESIN SYRUP AND DEXTROMETHORPHAN 5 ML: 100; 10 SYRUP ORAL at 20:03

## 2025-04-12 ASSESSMENT — PAIN SCALES - GENERAL
PAINLEVEL_OUTOF10: 7
PAINLEVEL_OUTOF10: 10
PAINLEVEL_OUTOF10: 8
PAINLEVEL_OUTOF10: 10
PAINLEVEL_OUTOF10: 8
PAINLEVEL_OUTOF10: 8
PAINLEVEL_OUTOF10: 10
PAINLEVEL_OUTOF10: 10
PAINLEVEL_OUTOF10: 4
PAINLEVEL_OUTOF10: 6
PAINLEVEL_OUTOF10: 6
PAINLEVEL_OUTOF10: 7
PAINLEVEL_OUTOF10: 8

## 2025-04-12 ASSESSMENT — PAIN DESCRIPTION - LOCATION
LOCATION: BACK
LOCATION: RIB CAGE
LOCATION: RIB CAGE
LOCATION: BACK;RIB CAGE
LOCATION: CHEST;RIB CAGE
LOCATION: BACK
LOCATION: CHEST
LOCATION: BACK
LOCATION: BACK;RIB CAGE

## 2025-04-12 ASSESSMENT — PAIN - FUNCTIONAL ASSESSMENT
PAIN_FUNCTIONAL_ASSESSMENT: PREVENTS OR INTERFERES WITH ALL ACTIVE AND SOME PASSIVE ACTIVITIES
PAIN_FUNCTIONAL_ASSESSMENT: PREVENTS OR INTERFERES WITH ALL ACTIVE AND SOME PASSIVE ACTIVITIES

## 2025-04-12 ASSESSMENT — PAIN DESCRIPTION - ORIENTATION
ORIENTATION: RIGHT;LOWER
ORIENTATION: RIGHT;LOWER
ORIENTATION: RIGHT
ORIENTATION: RIGHT;LOWER
ORIENTATION: RIGHT
ORIENTATION: RIGHT
ORIENTATION: RIGHT;LOWER
ORIENTATION: RIGHT
ORIENTATION: RIGHT;LOWER

## 2025-04-12 ASSESSMENT — PAIN DESCRIPTION - DESCRIPTORS
DESCRIPTORS: ACHING;STABBING;SORE
DESCRIPTORS: ACHING
DESCRIPTORS: ACHING
DESCRIPTORS: ACHING;STABBING;SORE

## 2025-04-12 ASSESSMENT — PAIN DESCRIPTION - PAIN TYPE: TYPE: ACUTE PAIN

## 2025-04-12 ASSESSMENT — PAIN SCALES - WONG BAKER
WONGBAKER_NUMERICALRESPONSE: NO HURT

## 2025-04-12 ASSESSMENT — PAIN DESCRIPTION - ONSET: ONSET: SUDDEN

## 2025-04-12 ASSESSMENT — PAIN DESCRIPTION - FREQUENCY: FREQUENCY: INTERMITTENT

## 2025-04-12 NOTE — PROGRESS NOTES
This RN performed a walking pulse ox on pt. Pt was able to walk 1/4 way down phelps with Oxygen levels remaining at 100%.    During walk pt complained of intense right lower back pain; pt had stop and wait for a few seconds before returning to bed. Pt needed assistance during walk d/t pain. This RN notified provider of walking assessment.

## 2025-04-12 NOTE — PROGRESS NOTES
When patient is eventually discharged, she will need an incentive spirometer.      Per orders from Akosua Tong, [facility]:   \"Please make sure patient is discharged home with an incentive spirometer.\"

## 2025-04-12 NOTE — PLAN OF CARE
Problem: Safety - Adult  Goal: Free from fall injury  Outcome: Progressing     Problem: Pain  Goal: Verbalizes/displays adequate comfort level or baseline comfort level  4/12/2025 1217 by Catarino Bolton RN  Outcome: Progressing  4/12/2025 0255 by Cleve Chinchilla RN  Outcome: Progressing     Problem: Discharge Planning  Goal: Discharge to home or other facility with appropriate resources  4/12/2025 1217 by Catarino Bolton RN  Outcome: Progressing  4/12/2025 0255 by Cleve Chinchilla RN  Outcome: Progressing     Problem: Respiratory - Adult  Goal: Achieves optimal ventilation and oxygenation  4/12/2025 1217 by Catarino Bolton RN  Outcome: Progressing  4/12/2025 0255 by Cleve Chinchilla RN  Outcome: Progressing     Problem: Skin/Tissue Integrity - Adult  Goal: Skin integrity remains intact  Outcome: Progressing

## 2025-04-12 NOTE — PROGRESS NOTES
[facility]                                Progress note    Subjective:    Resting in bed  Very uncomfortable with pain from rib fracture    Objective:    /70   Pulse 74   Temp 97.3 °F (36.3 °C) (Temporal)   Resp 18   Ht 1.727 m (5' 8\")   Wt 91.6 kg (202 lb)   SpO2 94%   BMI 30.71 kg/m²     In: 470 [P.O.:360; I.V.:10]  Out: 300   In: 470   Out: 300 [Urine:300]    General appearance: NAD, conversant  HEENT: AT/NC, MMM  Neck: FROM, supple  Lungs: Clear to auscultation  CV: RRR, no MRGs  Vasc: Radial pulses 2+  Abdomen: Soft, non-tender; no masses or HSM  Extremities: No peripheral edema or digital cyanosis  Skin: no rash, lesions or ulcers  Psych: Alert and oriented to person, place and time  Neuro: Alert and interactive     Recent Labs     04/10/25  1600 04/11/25  0459 04/12/25  0649   WBC 12.8* 9.4 8.5   HGB 13.7 12.7 11.4*   HCT 41.7 39.9 35.7    330 265       Recent Labs     04/10/25  1600 04/11/25  0459 04/12/25  0649    137 141   K 4.4 4.3 4.2    102 105   CO2 19* 22 22   BUN 15 19 17   CREATININE 0.8 0.9 0.7   CALCIUM 9.6 9.2 9.3       Assessment:    Principal Problem:    Pneumonia due to organism  Active Problems:    Right rib fracture  Resolved Problems:    * No resolved hospital problems. *      Plan:  Patient is a 59-year-old female admitted to [facility] for  Pneumonia with right rib fracture  - Continue IV Rocephin and Doxy plan to transition to p.o. on discharge  - Continue lidocaine patches, tramadol and morphine  - Add on Robaxin for muscle spasms  - Continue incentive spirometry  - No need for supplemental O2 on ambulation  - DC plan tomorrow when better pain control  - Downgrade to MedSurg    Code status: full  Consultants: trauma      DVT Prophylaxis Lovenox  PT/OT  Discharge planning         [facility] - CNP,  3:54 PM  4/12/2025

## 2025-04-13 VITALS
HEIGHT: 68 IN | BODY MASS INDEX: 30.62 KG/M2 | TEMPERATURE: 97.8 F | OXYGEN SATURATION: 93 % | SYSTOLIC BLOOD PRESSURE: 101 MMHG | WEIGHT: 202 LBS | RESPIRATION RATE: 17 BRPM | HEART RATE: 69 BPM | DIASTOLIC BLOOD PRESSURE: 58 MMHG

## 2025-04-13 LAB
ANION GAP SERPL CALCULATED.3IONS-SCNC: 12 MMOL/L (ref 7–16)
BASOPHILS # BLD: 0.06 K/UL (ref 0–0.2)
BASOPHILS NFR BLD: 1 % (ref 0–2)
BUN SERPL-MCNC: 14 MG/DL (ref 6–20)
CALCIUM SERPL-MCNC: 8.9 MG/DL (ref 8.6–10.2)
CHLORIDE SERPL-SCNC: 102 MMOL/L (ref 98–107)
CO2 SERPL-SCNC: 23 MMOL/L (ref 22–29)
CREAT SERPL-MCNC: 0.7 MG/DL (ref 0.5–1)
EOSINOPHIL # BLD: 0.21 K/UL (ref 0.05–0.5)
EOSINOPHILS RELATIVE PERCENT: 3 % (ref 0–6)
ERYTHROCYTE [DISTWIDTH] IN BLOOD BY AUTOMATED COUNT: 13.7 % (ref 11.5–15)
GFR, ESTIMATED: >90 ML/MIN/1.73M2
GLUCOSE SERPL-MCNC: 97 MG/DL (ref 74–99)
HCT VFR BLD AUTO: 33.9 % (ref 34–48)
HGB BLD-MCNC: 10.9 G/DL (ref 11.5–15.5)
IMM GRANULOCYTES # BLD AUTO: 0.03 K/UL (ref 0–0.58)
IMM GRANULOCYTES NFR BLD: 1 % (ref 0–5)
LYMPHOCYTES NFR BLD: 2.14 K/UL (ref 1.5–4)
LYMPHOCYTES RELATIVE PERCENT: 34 % (ref 20–42)
MCH RBC QN AUTO: 29.4 PG (ref 26–35)
MCHC RBC AUTO-ENTMCNC: 32.2 G/DL (ref 32–34.5)
MCV RBC AUTO: 91.4 FL (ref 80–99.9)
MICROORGANISM SPEC CULT: NORMAL
MICROORGANISM/AGENT SPEC: NORMAL
MONOCYTES NFR BLD: 0.7 K/UL (ref 0.1–0.95)
MONOCYTES NFR BLD: 11 % (ref 2–12)
NEUTROPHILS NFR BLD: 50 % (ref 43–80)
NEUTS SEG NFR BLD: 3.17 K/UL (ref 1.8–7.3)
PLATELET # BLD AUTO: 239 K/UL (ref 130–450)
PMV BLD AUTO: 10.9 FL (ref 7–12)
POTASSIUM SERPL-SCNC: 3.9 MMOL/L (ref 3.5–5)
RBC # BLD AUTO: 3.71 M/UL (ref 3.5–5.5)
SODIUM SERPL-SCNC: 137 MMOL/L (ref 132–146)
SPECIMEN DESCRIPTION: NORMAL
WBC OTHER # BLD: 6.3 K/UL (ref 4.5–11.5)

## 2025-04-13 PROCEDURE — 6360000002 HC RX W HCPCS: Performed by: NURSE PRACTITIONER

## 2025-04-13 PROCEDURE — 80048 BASIC METABOLIC PNL TOTAL CA: CPT

## 2025-04-13 PROCEDURE — 6370000000 HC RX 637 (ALT 250 FOR IP): Performed by: NURSE PRACTITIONER

## 2025-04-13 PROCEDURE — 6370000000 HC RX 637 (ALT 250 FOR IP): Performed by: STUDENT IN AN ORGANIZED HEALTH CARE EDUCATION/TRAINING PROGRAM

## 2025-04-13 PROCEDURE — 36415 COLL VENOUS BLD VENIPUNCTURE: CPT

## 2025-04-13 PROCEDURE — 2580000003 HC RX 258: Performed by: NURSE PRACTITIONER

## 2025-04-13 PROCEDURE — 2500000003 HC RX 250 WO HCPCS: Performed by: NURSE PRACTITIONER

## 2025-04-13 PROCEDURE — 85025 COMPLETE CBC W/AUTO DIFF WBC: CPT

## 2025-04-13 RX ORDER — GUAIFENESIN/DEXTROMETHORPHAN 100-10MG/5
5 SYRUP ORAL EVERY 4 HOURS PRN
Qty: 120 ML | Refills: 0 | Status: SHIPPED | OUTPATIENT
Start: 2025-04-13 | End: 2025-04-16

## 2025-04-13 RX ORDER — METHOCARBAMOL 500 MG/1
500 TABLET, FILM COATED ORAL 4 TIMES DAILY
Qty: 40 TABLET | Refills: 0 | Status: SHIPPED | OUTPATIENT
Start: 2025-04-13 | End: 2025-04-23

## 2025-04-13 RX ORDER — TRAMADOL HYDROCHLORIDE 50 MG/1
25 TABLET ORAL EVERY 8 HOURS PRN
Qty: 9 TABLET | Refills: 0 | Status: SHIPPED | OUTPATIENT
Start: 2025-04-13 | End: 2025-04-16

## 2025-04-13 RX ADMIN — ESCITALOPRAM OXALATE 20 MG: 10 TABLET ORAL at 08:55

## 2025-04-13 RX ADMIN — MORPHINE SULFATE 2 MG: 2 INJECTION, SOLUTION INTRAMUSCULAR; INTRAVENOUS at 06:03

## 2025-04-13 RX ADMIN — GUAIFENESIN SYRUP AND DEXTROMETHORPHAN 5 ML: 100; 10 SYRUP ORAL at 06:03

## 2025-04-13 RX ADMIN — MORPHINE SULFATE 1 MG: 2 INJECTION, SOLUTION INTRAMUSCULAR; INTRAVENOUS at 10:22

## 2025-04-13 RX ADMIN — METHOCARBAMOL 500 MG: 500 TABLET ORAL at 08:55

## 2025-04-13 RX ADMIN — ENOXAPARIN SODIUM 40 MG: 100 INJECTION SUBCUTANEOUS at 08:56

## 2025-04-13 RX ADMIN — MORPHINE SULFATE 2 MG: 2 INJECTION, SOLUTION INTRAMUSCULAR; INTRAVENOUS at 02:01

## 2025-04-13 RX ADMIN — DOXYCYCLINE 100 MG: 100 INJECTION, POWDER, LYOPHILIZED, FOR SOLUTION INTRAVENOUS at 04:00

## 2025-04-13 RX ADMIN — SODIUM CHLORIDE, PRESERVATIVE FREE 10 ML: 5 INJECTION INTRAVENOUS at 09:00

## 2025-04-13 RX ADMIN — PANTOPRAZOLE SODIUM 40 MG: 40 TABLET, DELAYED RELEASE ORAL at 06:03

## 2025-04-13 RX ADMIN — TRAMADOL HYDROCHLORIDE 25 MG: 50 TABLET, FILM COATED ORAL at 13:34

## 2025-04-13 RX ADMIN — GUAIFENESIN SYRUP AND DEXTROMETHORPHAN 5 ML: 100; 10 SYRUP ORAL at 10:22

## 2025-04-13 RX ADMIN — BENZONATATE 100 MG: 100 CAPSULE ORAL at 06:03

## 2025-04-13 RX ADMIN — METHOCARBAMOL 500 MG: 500 TABLET ORAL at 13:40

## 2025-04-13 RX ADMIN — OXYBUTYNIN CHLORIDE 10 MG: 10 TABLET, EXTENDED RELEASE ORAL at 08:55

## 2025-04-13 RX ADMIN — TRAMADOL HYDROCHLORIDE 25 MG: 50 TABLET, FILM COATED ORAL at 03:57

## 2025-04-13 ASSESSMENT — PAIN DESCRIPTION - LOCATION
LOCATION: RIB CAGE
LOCATION: BACK;FLANK

## 2025-04-13 ASSESSMENT — PAIN SCALES - GENERAL
PAINLEVEL_OUTOF10: 7
PAINLEVEL_OUTOF10: 8
PAINLEVEL_OUTOF10: 3
PAINLEVEL_OUTOF10: 3
PAINLEVEL_OUTOF10: 7
PAINLEVEL_OUTOF10: 7
PAINLEVEL_OUTOF10: 3

## 2025-04-13 ASSESSMENT — PAIN DESCRIPTION - DESCRIPTORS
DESCRIPTORS: ACHING
DESCRIPTORS: ACHING;DISCOMFORT;DULL

## 2025-04-13 ASSESSMENT — PAIN DESCRIPTION - ORIENTATION
ORIENTATION: RIGHT

## 2025-04-13 NOTE — PROGRESS NOTES
Patient stated several times she's being discharged home in the morning and didn't want to move units.  Spoke with April NP, ok for her to stay but will leave the transfer order in at this time.

## 2025-04-13 NOTE — CARE COORDINATION
Social Work Transition of Care Planning:  Discharge order noted. SW spoke to nursing patient has no needs.  Electronically signed by LORETTA Bazan on 4/13/2025 at 10:45 AM   
with basic dialogue that supports the patient's individualized plan of care/goals and shares the quality data associated with the providers was provided to:     Patient Representative Name:       The Patient and/or Patient Representative Agree with the Discharge Plan?      LORETTA Martinez  Case Management Department  Ph: [phone] Fax: [phone]

## 2025-04-13 NOTE — DISCHARGE SUMMARY
[facility]   Discharge summary   Patient ID:  Zayda Chuahan  [id number]  59 y.o.  1965    Admit date: 4/10/2025    Discharge date and time: 4/13/2025    Admission Diagnoses:   Patient Active Problem List   Diagnosis    Right hip pain    Stress fracture of femoral neck    Stroke-like symptoms    Migraine    Received intravenous tissue plasminogen activator (tPA) in emergency department    Cholecystitis    LU (generalized anxiety disorder)    Current mild episode of major depressive disorder without prior episode    Adult BMI 31.0-31.9 kg/sq m    Pneumonia due to organism    Right rib fracture       Discharge Diagnoses: Pneumonia, rib fracture    Consults: Trauma    Procedures: none    Hospital Course: The patient is a 59 y.o. female of Aaron Vanegas DO    Patient is a 59-year-old female admitted to [facility] for  Pneumonia with right rib fracture  - Continue IV Rocephin and Doxy plan to transition to p.o. on discharge  - Continue lidocaine patches, tramadol and morphine  - Add on Robaxin for muscle spasms  - Continue incentive spirometry  - No need for supplemental O2 on ambulation  - DC plan tomorrow when better pain control  - Downgrade to MedSurg    4/13/25  - Transition IV antibiotics to p.o. to complete treatment  - Follow-up with PCP within 1 week of discharge  - Education provided to continue use with incentive spirometry on discharge    Recent Labs     04/11/25  0459 04/12/25  0649 04/13/25  0554   WBC 9.4 8.5 6.3   HGB 12.7 11.4* 10.9*   HCT 39.9 35.7 33.9*    265 239       Recent Labs     04/11/25  0459 04/12/25  0649 04/13/25  0554    141 137   K 4.3 4.2 3.9    105 102   CO2 22 22 23   BUN 19 17 14   CREATININE 0.9 0.7 0.7   CALCIUM 9.2 9.3 8.9       CT CHEST WO CONTRAST  Result Date: 4/10/2025  EXAMINATION: CT OF THE CHEST WITHOUT CONTRAST 4/10/2025 1:50 pm TECHNIQUE: CT of the chest was performed without the administration of intravenous contrast.

## 2025-04-14 ENCOUNTER — TELEPHONE (OUTPATIENT)
Age: 60
End: 2025-04-14

## 2025-04-14 NOTE — TELEPHONE ENCOUNTER
Care Transitions Initial Follow Up Call    Outreach made within 2 business days of discharge: Yes    Patient: Zayda Chauhan Patient : 1965   MRN: [id number]  Reason for Admission: Pneumonia/Right rib Fracture  Discharge Date: admitted 4/10/25-25       Spoke with: Zayda    Discharge department/facility: [facility] Interactive Patient Contact:  Was patient able to fill all prescriptions: Yes  Was patient instructed to bring all medications to the follow-up visit: Yes  Is patient taking all medications as directed in the discharge summary? Yes  Does patient understand their discharge instructions: Yes  Does patient have questions or concerns that need addressed prior to 7-14 day follow up office visit: no    Additional needs identified to be addressed with provider  No needs identified             Scheduled appointment with PCP within 7-14 days    Follow Up  Future Appointments   Date Time Provider Department Center   2025 11:30 AM Aaron Vanegas DO [facility]   2025  8:30 AM Aaron Vanegas DO [facility]       Gini Ferro MA

## 2025-04-15 LAB
MICROORGANISM SPEC CULT: NORMAL
MICROORGANISM SPEC CULT: NORMAL
SERVICE CMNT-IMP: NORMAL
SERVICE CMNT-IMP: NORMAL
SPECIMEN DESCRIPTION: NORMAL
SPECIMEN DESCRIPTION: NORMAL

## 2025-04-16 ENCOUNTER — OFFICE VISIT (OUTPATIENT)
Age: 60
End: 2025-04-16
Payer: COMMERCIAL

## 2025-04-16 VITALS
BODY MASS INDEX: 30.83 KG/M2 | DIASTOLIC BLOOD PRESSURE: 71 MMHG | HEIGHT: 68 IN | RESPIRATION RATE: 16 BRPM | TEMPERATURE: 97.2 F | OXYGEN SATURATION: 97 % | HEART RATE: 93 BPM | SYSTOLIC BLOOD PRESSURE: 105 MMHG | WEIGHT: 203.4 LBS

## 2025-04-16 DIAGNOSIS — Z09 HOSPITAL DISCHARGE FOLLOW-UP: Primary | ICD-10-CM

## 2025-04-16 DIAGNOSIS — S22.31XD CLOSED FRACTURE OF ONE RIB OF RIGHT SIDE WITH ROUTINE HEALING, SUBSEQUENT ENCOUNTER: ICD-10-CM

## 2025-04-16 DIAGNOSIS — J18.9 PNEUMONIA OF LEFT LOWER LOBE DUE TO INFECTIOUS ORGANISM: ICD-10-CM

## 2025-04-16 PROCEDURE — 1111F DSCHRG MED/CURRENT MED MERGE: CPT | Performed by: FAMILY MEDICINE

## 2025-04-16 PROCEDURE — 99215 OFFICE O/P EST HI 40 MIN: CPT | Performed by: FAMILY MEDICINE

## 2025-04-16 RX ORDER — HYDROQUINONE 40 MG/G
CREAM TOPICAL
COMMUNITY
Start: 2025-02-04

## 2025-04-16 RX ORDER — PANTOPRAZOLE SODIUM 40 MG/1
40 TABLET, DELAYED RELEASE ORAL DAILY
COMMUNITY

## 2025-04-16 RX ORDER — PREDNISONE 10 MG/1
TABLET ORAL
Qty: 40 TABLET | Refills: 0 | Status: SHIPPED | OUTPATIENT
Start: 2025-04-16

## 2025-04-16 RX ORDER — OXYBUTYNIN CHLORIDE 5 MG/1
5 TABLET ORAL 3 TIMES DAILY
COMMUNITY
End: 2025-04-16

## 2025-04-16 RX ORDER — OXYBUTYNIN CHLORIDE 10 MG/1
10 TABLET, EXTENDED RELEASE ORAL DAILY
COMMUNITY

## 2025-04-16 NOTE — PROGRESS NOTES
Post-Discharge Transitional Care  Follow Up      Zayda Chauhan   YOB: 1965    Date of Office Visit:  4/16/2025  Date of Hospital Admission: 4/10/25  Date of Hospital Discharge: 4/13/25  Risk of hospital readmission (high >=14%. Medium >=10%) :Readmission Risk Score: 6.7      Care management risk score Rising risk (score 2-5) and Complex Care (Scores >=6): No Risk Score On File     Non face to face  following discharge, date last encounter closed (first attempt may have been earlier): 04/14/2025    Call initiated 2 business days of discharge: Yes    ASSESSMENT/PLAN:   Hospital discharge follow-up  -     [place] DISCHARGE MEDS RECONCILED W/ CURRENT OUTPATIENT MED LIST  Closed fracture of one rib of right side with routine healing, subsequent encounter  The following orders have not been finalized:  -     predniSONE (DELTASONE) 10 MG tablet  Pneumonia of left lower lobe due to infectious organism  The following orders have not been finalized:  -     predniSONE (DELTASONE) 10 MG tablet    Medical Decision Making: high complexity  Return if symptoms worsen or fail to improve.         In summary I am going to see her back again in about 3 weeks she will keep her appointment in August she will get a bone density this summer.  And otherwise she will continue nutrition liquids call me if she worsens.  And prednisone I think will help her a little bit also.  I want her to call me next week on how she is feeling show get the chest x-ray we will call and reciprocate and ask how she is doing she will start the prednisone today and she is going to take this with food  Subjective:   HPI:  Follow up of Hospital problems/diagnosis(es): CAP, Fatigue, Acute resp distress, dyspnea    Inpatient course: Discharge summary reviewed- see chart.    Interval history/Current status: Patient comes in today with a TCM visit for community-acquired pneumonia.  The patient went into the hospital on the 10th came home on 13 April.  She

## 2025-05-07 ENCOUNTER — OFFICE VISIT (OUTPATIENT)
Age: 60
End: 2025-05-07
Payer: COMMERCIAL

## 2025-05-07 VITALS
RESPIRATION RATE: 18 BRPM | SYSTOLIC BLOOD PRESSURE: 120 MMHG | WEIGHT: 207 LBS | HEIGHT: 68 IN | OXYGEN SATURATION: 99 % | TEMPERATURE: 97.8 F | HEART RATE: 99 BPM | BODY MASS INDEX: 31.37 KG/M2 | DIASTOLIC BLOOD PRESSURE: 79 MMHG

## 2025-05-07 DIAGNOSIS — J20.8 ACUTE BACTERIAL BRONCHITIS: ICD-10-CM

## 2025-05-07 DIAGNOSIS — F41.1 GAD (GENERALIZED ANXIETY DISORDER): Primary | ICD-10-CM

## 2025-05-07 DIAGNOSIS — B96.89 ACUTE BACTERIAL BRONCHITIS: ICD-10-CM

## 2025-05-07 DIAGNOSIS — J18.9 PNEUMONIA OF LEFT LOWER LOBE DUE TO INFECTIOUS ORGANISM: ICD-10-CM

## 2025-05-07 DIAGNOSIS — G43.709 CHRONIC MIGRAINE WITHOUT AURA WITHOUT STATUS MIGRAINOSUS, NOT INTRACTABLE: ICD-10-CM

## 2025-05-07 PROCEDURE — 3017F COLORECTAL CA SCREEN DOC REV: CPT | Performed by: FAMILY MEDICINE

## 2025-05-07 PROCEDURE — 99213 OFFICE O/P EST LOW 20 MIN: CPT | Performed by: FAMILY MEDICINE

## 2025-05-07 PROCEDURE — 1111F DSCHRG MED/CURRENT MED MERGE: CPT | Performed by: FAMILY MEDICINE

## 2025-05-07 PROCEDURE — G8427 DOCREV CUR MEDS BY ELIG CLIN: HCPCS | Performed by: FAMILY MEDICINE

## 2025-05-07 PROCEDURE — G8417 CALC BMI ABV UP PARAM F/U: HCPCS | Performed by: FAMILY MEDICINE

## 2025-05-07 PROCEDURE — 1036F TOBACCO NON-USER: CPT | Performed by: FAMILY MEDICINE

## 2025-05-07 RX ORDER — ALBUTEROL SULFATE 90 UG/1
2 INHALANT RESPIRATORY (INHALATION) 4 TIMES DAILY PRN
Qty: 1 EACH | Refills: 0 | Status: SHIPPED | OUTPATIENT
Start: 2025-05-07 | End: 2025-06-06

## 2025-05-07 RX ORDER — RIZATRIPTAN BENZOATE 10 MG/1
10 TABLET, ORALLY DISINTEGRATING ORAL
Qty: 30 TABLET | Refills: 3 | Status: SHIPPED | OUTPATIENT
Start: 2025-05-07

## 2025-05-07 RX ORDER — IBUPROFEN 800 MG/1
800 TABLET, FILM COATED ORAL EVERY 8 HOURS PRN
Qty: 120 TABLET | Refills: 1 | Status: SHIPPED | OUTPATIENT
Start: 2025-05-07

## 2025-05-07 RX ORDER — ALBUTEROL SULFATE 90 UG/1
2 INHALANT RESPIRATORY (INHALATION) 4 TIMES DAILY PRN
Qty: 1 EACH | Refills: 0 | Status: CANCELLED | OUTPATIENT
Start: 2025-05-07 | End: 2025-06-06

## 2025-05-07 NOTE — ASSESSMENT & PLAN NOTE
Chronic, at goal (stable), continue current treatment plan, medication adherence emphasized, and lifestyle modifications recommended  Her lungs are clear her chest x-ray will be forthcoming she is doing much much better I did recommend that she get a pneumonia shot at the pharmacy after Memorial Day.

## 2025-05-07 NOTE — ASSESSMENT & PLAN NOTE
Chronic, at goal (stable), continue current treatment plan, medication adherence emphasized, and lifestyle modifications recommended  I refilled her ibuprofen and her Maxalt she is due to have injections of Botox in [facility] in the near future

## 2025-05-07 NOTE — PROGRESS NOTES
bruit.   Cardiovascular:      Rate and Rhythm: Normal rate and regular rhythm.      Pulses: Normal pulses.      Heart sounds: Normal heart sounds. No murmur heard.     No friction rub. No gallop.   Pulmonary:      Effort: Pulmonary effort is normal. No respiratory distress.      Breath sounds: Normal breath sounds. No stridor. No wheezing, rhonchi or rales.   Chest:      Chest wall: No tenderness.   Abdominal:      General: Abdomen is flat. Bowel sounds are normal. There is no distension.      Palpations: Abdomen is soft. There is no mass.      Tenderness: There is no abdominal tenderness. There is no right CVA tenderness, left CVA tenderness, guarding or rebound.      Hernia: No hernia is present.   Musculoskeletal:         General: No swelling, tenderness, deformity or signs of injury. Normal range of motion.      Cervical back: Normal range of motion and neck supple. No rigidity or tenderness.      Right lower leg: No edema.      Left lower leg: No edema.   Lymphadenopathy:      Cervical: No cervical adenopathy.   Skin:     General: Skin is warm and dry.      Capillary Refill: Capillary refill takes less than 2 seconds.      Coloration: Skin is not jaundiced or pale.      Findings: No bruising, erythema, lesion or rash.   Neurological:      General: No focal deficit present.      Mental Status: She is alert. Mental status is at baseline.      Cranial Nerves: No cranial nerve deficit.      Sensory: No sensory deficit.      Motor: No weakness.      Coordination: Coordination normal.      Gait: Gait normal.      Deep Tendon Reflexes: Reflexes normal.   Psychiatric:         Mood and Affect: Mood normal.         Behavior: Behavior normal.         Thought Content: Thought content normal.         Judgment: Judgment normal.                 An electronic signature was used to authenticate this note.    --Aaron Vanegas DO

## 2025-05-07 NOTE — ASSESSMENT & PLAN NOTE
Chronic, at goal (stable), continue current treatment plan  Will continue her current dose of medication Lexapro

## 2025-05-19 RX ORDER — OXYBUTYNIN CHLORIDE 10 MG/1
10 TABLET, EXTENDED RELEASE ORAL DAILY
Qty: 90 TABLET | Refills: 3 | Status: SHIPPED | OUTPATIENT
Start: 2025-05-19

## 2025-06-16 ENCOUNTER — TELEPHONE (OUTPATIENT)
Age: 60
End: 2025-06-16

## 2025-06-16 DIAGNOSIS — R19.5 BRIGHT RED STOOL: Primary | ICD-10-CM

## 2025-06-16 NOTE — TELEPHONE ENCOUNTER
The patient called for cc of     Since last Sunday when she has a bowel movement her commode is full of blood and sometimes clots. She does not feel she has external hemorrhoids. Her last cologuard was normal 1/2024    Since she has her GB removed 2 years ago her bowels are always watery, soft and and she has to go right after she eats she states. Her bowel movements have never been the same. Please advise.

## 2025-07-02 ENCOUNTER — OFFICE VISIT (OUTPATIENT)
Dept: SURGERY | Age: 60
End: 2025-07-02
Payer: COMMERCIAL

## 2025-07-02 ENCOUNTER — PREP FOR PROCEDURE (OUTPATIENT)
Dept: SURGERY | Age: 60
End: 2025-07-02

## 2025-07-02 VITALS
BODY MASS INDEX: 31.83 KG/M2 | DIASTOLIC BLOOD PRESSURE: 81 MMHG | OXYGEN SATURATION: 97 % | SYSTOLIC BLOOD PRESSURE: 121 MMHG | RESPIRATION RATE: 18 BRPM | HEIGHT: 68 IN | WEIGHT: 210 LBS | HEART RATE: 76 BPM

## 2025-07-02 DIAGNOSIS — K62.5 RECTAL BLEEDING: ICD-10-CM

## 2025-07-02 DIAGNOSIS — K62.5 BRIGHT RED BLOOD PER RECTUM: Primary | ICD-10-CM

## 2025-07-02 DIAGNOSIS — K57.90 DIVERTICULOSIS: ICD-10-CM

## 2025-07-02 DIAGNOSIS — R19.7 DIARRHEA, UNSPECIFIED TYPE: ICD-10-CM

## 2025-07-02 PROCEDURE — 99204 OFFICE O/P NEW MOD 45 MIN: CPT | Performed by: SURGERY

## 2025-07-02 PROCEDURE — G8417 CALC BMI ABV UP PARAM F/U: HCPCS | Performed by: SURGERY

## 2025-07-02 PROCEDURE — G8427 DOCREV CUR MEDS BY ELIG CLIN: HCPCS | Performed by: SURGERY

## 2025-07-02 PROCEDURE — 3017F COLORECTAL CA SCREEN DOC REV: CPT | Performed by: SURGERY

## 2025-07-02 PROCEDURE — 1036F TOBACCO NON-USER: CPT | Performed by: SURGERY

## 2025-07-02 RX ORDER — CHOLESTYRAMINE 4 G/9G
1 POWDER, FOR SUSPENSION ORAL 2 TIMES DAILY
Qty: 60 PACKET | Refills: 3 | Status: SHIPPED | OUTPATIENT
Start: 2025-07-02

## 2025-07-02 RX ORDER — SODIUM CHLORIDE 0.9 % (FLUSH) 0.9 %
5-40 SYRINGE (ML) INJECTION PRN
OUTPATIENT
Start: 2025-07-02

## 2025-07-02 RX ORDER — SODIUM CHLORIDE 0.9 % (FLUSH) 0.9 %
5-40 SYRINGE (ML) INJECTION EVERY 12 HOURS SCHEDULED
OUTPATIENT
Start: 2025-07-02

## 2025-07-02 RX ORDER — SODIUM CHLORIDE 9 MG/ML
INJECTION, SOLUTION INTRAVENOUS PRN
OUTPATIENT
Start: 2025-07-02

## 2025-07-02 NOTE — H&P (VIEW-ONLY)
[facility] Surgery Clinic Note    Assessment/Plan:      Diagnosis Orders   1. Bright red blood per rectum  CBC with Auto Differential    Basic Metabolic Panel      2. Diarrhea, unspecified type        3. Diverticulosis            Prescribe metamucil and cholestyramine  Schedule for diagnostic colonoscopy    I have explained the risks, benefits, alternatives, and potential complications associated with the proposed procedure to be performed and other issues when applicable with the patient/responsible party prior to the procedure. All of their questions were answered as appropriate and to their satisfaction. They understand and agree to the procedure.     Chief Complaint   Patient presents with    New Patient     blood in stool, dr henson        PCP: Aaron Henson,     HPI: Zayda Chauhan is a 59 y.o. female who presents in consultation for blood in stool. Patient states that about 2-3 weeks ago she noticed abrupt onset of bright red blood per rectum with clots in her bowel movements. She has not prior history of this. No abdominal pain. It has slowed down some since that time, happening now every couple of days. Has never had a colonoscopy before. CT scan in 2023 showed diverticulosis. She had cholecystectomy in 2023 and since that time she has had chronic diarrhea.     Past Medical History:   Diagnosis Date    Headache(784.0)     migraine       Past Surgical History:   Procedure Laterality Date    APPENDECTOMY      CHOLECYSTECTOMY      CHOLECYSTECTOMY, LAPAROSCOPIC N/A 07/06/2023    CHOLECYSTECTOMY LAPAROSCOPIC performed by Angel Mackenzie MD at [facility] OR    COLONOSCOPY      cologuard1/12/2024 Negative    HIP CLOSED REDUCTION  [id number]    RIGHT WITH PERCUTANEOUS PINNING    HYSTERECTOMY (CERVIX STATUS UNKNOWN)  2008       Prior to Admission medications    Medication Sig Start Date End Date Taking? Authorizing Provider   psyllium (KONSYL) 28.3 % POWD powder Take 3.4 g by mouth daily 7/2/25 5/14/26 Yes Joyce

## 2025-07-02 NOTE — PROGRESS NOTES
[facility] Surgery Clinic Note    Assessment/Plan:      Diagnosis Orders   1. Bright red blood per rectum  CBC with Auto Differential    Basic Metabolic Panel      2. Diarrhea, unspecified type        3. Diverticulosis            Prescribe metamucil and cholestyramine  Schedule for diagnostic colonoscopy    I have explained the risks, benefits, alternatives, and potential complications associated with the proposed procedure to be performed and other issues when applicable with the patient/responsible party prior to the procedure. All of their questions were answered as appropriate and to their satisfaction. They understand and agree to the procedure.     Chief Complaint   Patient presents with    New Patient     blood in stool, dr henson        PCP: Aaron Henson,     HPI: Zayda Chauhan is a 59 y.o. female who presents in consultation for blood in stool. Patient states that about 2-3 weeks ago she noticed abrupt onset of bright red blood per rectum with clots in her bowel movements. She has not prior history of this. No abdominal pain. It has slowed down some since that time, happening now every couple of days. Has never had a colonoscopy before. CT scan in 2023 showed diverticulosis. She had cholecystectomy in 2023 and since that time she has had chronic diarrhea.     Past Medical History:   Diagnosis Date    Headache(784.0)     migraine       Past Surgical History:   Procedure Laterality Date    APPENDECTOMY      CHOLECYSTECTOMY      CHOLECYSTECTOMY, LAPAROSCOPIC N/A 07/06/2023    CHOLECYSTECTOMY LAPAROSCOPIC [place] [facility] [place]    COLONOSCOPY      cologuard1/12/2024 Negative    HIP CLOSED REDUCTION  [id number]    RIGHT WITH PERCUTANEOUS PINNING    HYSTERECTOMY (CERVIX STATUS UNKNOWN)  2008       Prior to Admission medications    Medication Sig Start Date End Date Taking? Authorizing Provider   psyllium (KONSYL) 28.3 % POWD powder Take 3.4 g by mouth daily 7/2/25 5/14/26 Yes Joyce

## 2025-07-07 ENCOUNTER — TELEPHONE (OUTPATIENT)
Dept: SURGERY | Age: 60
End: 2025-07-07

## 2025-07-07 NOTE — TELEPHONE ENCOUNTER
Scheduled patient for diagnostic colonoscopy on 7/15/25 @ 3:00PM at [facility], patient is to arrive at 2:00PM. Hold ASA for 5 days prior. Informed patient of date and time, patient verbalized understanding. Instruction sheet given.  Electronically signed by Lea Gonsalez MA on 7/7/25 at 10:00 AM EDT

## 2025-07-14 NOTE — PROGRESS NOTES
[facility] PRE-ADMISSION TESTING INSTRUCTIONS    The Preadmission Testing patient is instructed accordingly using the following criteria (check applicable):    ARRIVAL INSTRUCTIONS:   Arrival Time: 1200    [x] Parking the day of Surgery is located in the Main Entrance lot.  Upon entering through the main entrance (Entrance A) make an immediate right to the surgery reception desk    [x] Bring photo ID and insurance card    [x] Please be sure to arrange for a responsible adult to provide transportation to and from the hospital    [x] Please arrange for a responsible adult to be with you for the 24 hour period post procedure, due to having anesthesia    [x] Please notify surgeon if you develop any illness between now and time of surgery (cold, cough, sore throat, fever, nausea, vomiting) or any signs of infections  including skin, wounds, and dental.    [x] If you awake am of surgery not feeling well or have temperature >100 please call [phone].    GENERAL INSTRUCTIONS:    [x] You may have up to 8oz of water until 4 hours prior to surgery. No gum, no candy, no mints.  NPO time: 0900    [x] Follow bowel prep as instructed per surgeon       [x] You may brush your teeth    [x] Take medications as instructed (Read back and verified by patient)   Take albuterol if needed, Lexapro and Protonix    [x] Bring inhalers day of surgery    [x] Stop herbal supplements and vitamins 5 days prior to procedure    [x] Follow preop dosing of blood thinners per physician instructions    [x] Shower or bath with soap, lather and rinse well, AM of Surgery, no lotion, powders or creams to surgical site    [x] Please do not wear any nail polish, make up, hair products, body spray, aftershave, cologne or perfume on the day of surgery    [x] Jewelry, body piercings, eyeglasses, contact lenses and dentures are not permitted into surgery (bring cases)    [x] No tobacco products within 24 hours of surgery     [x] No

## 2025-07-15 ENCOUNTER — ANESTHESIA (OUTPATIENT)
Dept: ENDOSCOPY | Age: 60
End: 2025-07-15
Payer: COMMERCIAL

## 2025-07-15 ENCOUNTER — HOSPITAL ENCOUNTER (OUTPATIENT)
Age: 60
Setting detail: OUTPATIENT SURGERY
Discharge: HOME | End: 2025-07-15
Attending: SURGERY | Admitting: SURGERY
Payer: COMMERCIAL

## 2025-07-15 ENCOUNTER — ANESTHESIA EVENT (OUTPATIENT)
Dept: ENDOSCOPY | Age: 60
End: 2025-07-15
Payer: COMMERCIAL

## 2025-07-15 VITALS
TEMPERATURE: 97.4 F | RESPIRATION RATE: 20 BRPM | SYSTOLIC BLOOD PRESSURE: 108 MMHG | BODY MASS INDEX: 31.83 KG/M2 | OXYGEN SATURATION: 98 % | DIASTOLIC BLOOD PRESSURE: 66 MMHG | HEART RATE: 63 BPM | HEIGHT: 68 IN | WEIGHT: 210 LBS

## 2025-07-15 DIAGNOSIS — K57.90 DIVERTICULOSIS: ICD-10-CM

## 2025-07-15 DIAGNOSIS — K62.5 BRIGHT RED BLOOD PER RECTUM: ICD-10-CM

## 2025-07-15 DIAGNOSIS — R19.7 DIARRHEA, UNSPECIFIED TYPE: ICD-10-CM

## 2025-07-15 PROCEDURE — 7100000011 HC PHASE II RECOVERY - ADDTL 15 MIN: Performed by: SURGERY

## 2025-07-15 PROCEDURE — 3609027000 HC COLONOSCOPY: Performed by: SURGERY

## 2025-07-15 PROCEDURE — 3700000000 HC ANESTHESIA ATTENDED CARE: Performed by: SURGERY

## 2025-07-15 PROCEDURE — 3700000001 HC ADD 15 MINUTES (ANESTHESIA): Performed by: SURGERY

## 2025-07-15 PROCEDURE — 2709999900 HC NON-CHARGEABLE SUPPLY: Performed by: SURGERY

## 2025-07-15 PROCEDURE — 45378 DIAGNOSTIC COLONOSCOPY: CPT | Performed by: SURGERY

## 2025-07-15 PROCEDURE — 6360000002 HC RX W HCPCS

## 2025-07-15 PROCEDURE — 7100000010 HC PHASE II RECOVERY - FIRST 15 MIN: Performed by: SURGERY

## 2025-07-15 PROCEDURE — 2580000003 HC RX 258

## 2025-07-15 RX ORDER — PROPOFOL 10 MG/ML
INJECTION, EMULSION INTRAVENOUS
Status: DISCONTINUED | OUTPATIENT
Start: 2025-07-15 | End: 2025-07-15 | Stop reason: SDUPTHER

## 2025-07-15 RX ORDER — SODIUM CHLORIDE 0.9 % (FLUSH) 0.9 %
5-40 SYRINGE (ML) INJECTION EVERY 12 HOURS SCHEDULED
Status: DISCONTINUED | OUTPATIENT
Start: 2025-07-15 | End: 2025-07-15 | Stop reason: HOSPADM

## 2025-07-15 RX ORDER — SODIUM CHLORIDE 9 MG/ML
INJECTION, SOLUTION INTRAVENOUS PRN
Status: DISCONTINUED | OUTPATIENT
Start: 2025-07-15 | End: 2025-07-15 | Stop reason: HOSPADM

## 2025-07-15 RX ORDER — SODIUM CHLORIDE 9 MG/ML
INJECTION, SOLUTION INTRAVENOUS
Status: DISCONTINUED | OUTPATIENT
Start: 2025-07-15 | End: 2025-07-15 | Stop reason: SDUPTHER

## 2025-07-15 RX ORDER — SODIUM CHLORIDE 0.9 % (FLUSH) 0.9 %
5-40 SYRINGE (ML) INJECTION PRN
Status: DISCONTINUED | OUTPATIENT
Start: 2025-07-15 | End: 2025-07-15 | Stop reason: HOSPADM

## 2025-07-15 RX ADMIN — PROPOFOL 570 MG: 10 INJECTION, EMULSION INTRAVENOUS at 13:18

## 2025-07-15 RX ADMIN — SODIUM CHLORIDE: 9 INJECTION, SOLUTION INTRAVENOUS at 13:17

## 2025-07-15 ASSESSMENT — PAIN - FUNCTIONAL ASSESSMENT: PAIN_FUNCTIONAL_ASSESSMENT: 0-10

## 2025-07-15 NOTE — ANESTHESIA POSTPROCEDURE EVALUATION
Department of Anesthesiology  Postprocedure Note    Patient: Zayda Chauhan  MRN: [id number]  YOB: 1965  Date of evaluation: 7/15/2025    Procedure Summary       Date: 07/15/25 Room / Location: [place] [address] / [facility]    Anesthesia Start: 1317 Anesthesia Stop: 1350    Procedure: COLONOSCOPY DIAGNOSTIC Diagnosis:       Rectal bleeding      (Rectal bleeding [K62.5])    Surgeons: [facility] DO Responsible Provider: Ric Das MD    Anesthesia Type: MAC ASA Status: 3            Anesthesia Type: No value filed.    Felicita Phase I:      Felicita Phase II:      Anesthesia Post Evaluation    Patient location during evaluation: bedside  Patient participation: complete - patient participated  Level of consciousness: awake  Pain score: 0  Airway patency: patent  Nausea & Vomiting: no nausea and no vomiting  Cardiovascular status: blood pressure returned to baseline and hemodynamically stable  Respiratory status: acceptable  Hydration status: euvolemic  Pain management: adequate        No notable events documented.

## 2025-07-15 NOTE — DISCHARGE INSTRUCTIONS
[facility] Colonoscopy PROCEDURE DISCHARGE INSTRUCTIONS  You may be drowsy or lightheaded after receiving sedation or anesthesia.    A responsible person should be with you for the next 24 hours.    Please follow the instructions checked below:    DIET INSTRUCTIONS:  [x]Start with light diet and progress to your normal diet as you feel like eating. If you experience nausea or repeated episodes of vomiting which persist beyond 12-24 hours, notify your doctor.  []Other     ACTIVITY INSTRUCTIONS:  [x]Rest today. Increase activity as tolerated    []No heavy lifting or strenuous activity     [x]No driving for today  []Other      MEDICATION INSTRUCTIONS:    []Prescriptions sent with you.  Use as directed.  When taking pain medications, you may experience dizziness or drowsiness.  Do not drink alcohol or drive when taking these medications.  [x]Continue preop medications                               Post-procedure Care   If any tissue was removed:   It will be sent to a lab to be examined. It may take 1-2 weeks for results. The doctor will usually give an initial report after the scope is removed. Other tests may be recommended.   A small amount of bleeding may occur during the first few days after the procedure.     When you return home after the procedure, be sure to follow your doctor's instructions, which may include:   Resume medicines as instructed by your doctor.   Resume normal diet, unless directed otherwise by your doctor.   The sedative will make you drowsy. Avoid driving, operating machinery, or making important decisions for the rest of the day.   Rest for the remainder of the day.     After arriving home, contact your doctor if any of the following occurs:   Bleeding from your rectum, notify your doctor if you pass a teaspoonful of blood or more.   Black, tarry stools   Severe abdominal pain   Hard, swollen abdomen   Signs of infection, including fever or chills   Inability to pass gas or

## 2025-07-15 NOTE — INTERVAL H&P NOTE
Update History & Physical    The patient's History and Physical of July 2, 2025 was reviewed with the patient and I examined the patient. There was no change. The surgical site was confirmed by the patient and me.     Plan: The risks, benefits, expected outcome, and alternative to the recommended procedure have been discussed with the patient. Patient understands and wants to proceed with the procedure.     Electronically signed by [facility] DO on 7/15/2025 at 12:36 PM

## 2025-07-15 NOTE — ANESTHESIA PRE PROCEDURE
Department of Anesthesiology  Preprocedure Note       Name:  Zayda Chauhan   Age:  59 y.o.  :  1965                                          MRN:  [id number]         Date:  7/15/2025      Surgeon: Surgeon(s):  [facility] DO    Procedure: Procedure(s):  COLONOSCOPY DIAGNOSTIC    Medications prior to admission:   Prior to Admission medications    Medication Sig Start Date End Date Taking? Authorizing Provider   psyllium (KONSYL) 28.3 % POWD powder Take 3.4 g by mouth daily  Patient not taking: Reported on 2025  Harry Cook DO   oxyBUTYnin (DITROPAN-XL) 10 MG extended release tablet TAKE 1 TABLET BY MOUTH ONCE DAILY 25   Aaron Vanegas,    ibuprofen (ADVIL;MOTRIN) 800 MG tablet Take 1 tablet by mouth every 8 hours as needed for Pain Headache-migraine 25   Aaron Vanegas,    rizatriptan (MAXALT-MLT) 10 MG disintegrating tablet Take 1 tablet by mouth once as needed for Migraine 25   Aaron Vanegas,    albuterol sulfate HFA (VENTOLIN HFA) 108 (90 Base) MCG/ACT inhaler Inhale 2 puffs into the lungs 4 times daily as needed for Wheezing 25  Aaron Vanegas DO   hydroquinone 4 % cream APPLY TO FACE EVERY DAY  Patient not taking: Reported on 2025   Provider, MD Cris   pantoprazole (PROTONIX) 40 MG tablet Take 1 tablet by mouth daily    ProviderCris MD   escitalopram (LEXAPRO) 20 MG tablet TAKE 1 TABLET BY MOUTH EVERY DAY 10/8/24   Aaron Vanegas DO       Current medications:    Current Facility-Administered Medications   Medication Dose Route Frequency Provider Last Rate Last Admin    sodium chloride flush 0.9 % injection 5-40 mL  5-40 mL IntraVENous 2 times per day Harry Cook DO        sodium chloride flush 0.9 % injection 5-40 mL  5-40 mL IntraVENous PRN Harry Cook,         0.9 % sodium chloride infusion   IntraVENous PRN Harry Cook DO           Allergies:  No Known Allergies    Problem List:

## 2025-08-04 ENCOUNTER — OFFICE VISIT (OUTPATIENT)
Age: 60
End: 2025-08-04
Payer: COMMERCIAL

## 2025-08-04 VITALS
OXYGEN SATURATION: 97 % | RESPIRATION RATE: 16 BRPM | DIASTOLIC BLOOD PRESSURE: 74 MMHG | HEART RATE: 92 BPM | BODY MASS INDEX: 31.71 KG/M2 | SYSTOLIC BLOOD PRESSURE: 105 MMHG | HEIGHT: 68 IN | WEIGHT: 209.2 LBS | TEMPERATURE: 97.9 F

## 2025-08-04 DIAGNOSIS — G43.709 CHRONIC MIGRAINE WITHOUT AURA WITHOUT STATUS MIGRAINOSUS, NOT INTRACTABLE: ICD-10-CM

## 2025-08-04 DIAGNOSIS — Z12.31 ENCOUNTER FOR SCREENING MAMMOGRAM FOR MALIGNANT NEOPLASM OF BREAST: ICD-10-CM

## 2025-08-04 DIAGNOSIS — F32.0 CURRENT MILD EPISODE OF MAJOR DEPRESSIVE DISORDER WITHOUT PRIOR EPISODE: ICD-10-CM

## 2025-08-04 DIAGNOSIS — Z00.00 ENCOUNTER FOR WELL ADULT EXAM WITHOUT ABNORMAL FINDINGS: Primary | ICD-10-CM

## 2025-08-04 DIAGNOSIS — K21.9 GASTROESOPHAGEAL REFLUX DISEASE WITHOUT ESOPHAGITIS: ICD-10-CM

## 2025-08-04 PROCEDURE — 99396 PREV VISIT EST AGE 40-64: CPT | Performed by: FAMILY MEDICINE

## 2025-08-04 ASSESSMENT — ENCOUNTER SYMPTOMS
FACIAL SWELLING: 0
ALLERGIC/IMMUNOLOGIC NEGATIVE: 1
RESPIRATORY NEGATIVE: 1
GASTROINTESTINAL NEGATIVE: 1
RECTAL PAIN: 0
EYES NEGATIVE: 1
SINUS PAIN: 0

## 2025-08-08 DIAGNOSIS — E78.2 MIXED HYPERLIPIDEMIA: ICD-10-CM

## 2025-08-08 LAB
ALBUMIN: 4 G/DL (ref 3.5–5.2)
ALP BLD-CCNC: 95 U/L (ref 35–104)
ALT SERPL-CCNC: 28 U/L (ref 0–35)
ANION GAP SERPL CALCULATED.3IONS-SCNC: 12 MMOL/L (ref 7–16)
AST SERPL-CCNC: 28 U/L (ref 0–35)
BASOPHILS ABSOLUTE: 0.06 K/UL (ref 0–0.2)
BASOPHILS RELATIVE PERCENT: 1 % (ref 0–2)
BILIRUB SERPL-MCNC: 0.3 MG/DL (ref 0–1.2)
BUN BLDV-MCNC: 20 MG/DL (ref 6–20)
CALCIUM SERPL-MCNC: 9.6 MG/DL (ref 8.6–10)
CHLORIDE BLD-SCNC: 106 MMOL/L (ref 98–107)
CHOLESTEROL, FASTING: 240 MG/DL
CO2: 24 MMOL/L (ref 22–29)
CREAT SERPL-MCNC: 0.8 MG/DL (ref 0.5–1)
EOSINOPHILS ABSOLUTE: 0.16 K/UL (ref 0.05–0.5)
EOSINOPHILS RELATIVE PERCENT: 3 % (ref 0–6)
GFR, ESTIMATED: 84 ML/MIN/1.73M2
GLUCOSE FASTING: 97 MG/DL (ref 74–99)
HCT VFR BLD CALC: 42.2 % (ref 34–48)
HDLC SERPL-MCNC: 51 MG/DL
HEMOGLOBIN: 13.5 G/DL (ref 11.5–15.5)
IMMATURE GRANULOCYTES %: 1 % (ref 0–5)
IMMATURE GRANULOCYTES ABSOLUTE: 0.04 K/UL (ref 0–0.58)
LDL CHOLESTEROL: 144 MG/DL
LYMPHOCYTES ABSOLUTE: 1.96 K/UL (ref 1.5–4)
LYMPHOCYTES RELATIVE PERCENT: 30 % (ref 20–42)
MCH RBC QN AUTO: 29.3 PG (ref 26–35)
MCHC RBC AUTO-ENTMCNC: 32 G/DL (ref 32–34.5)
MCV RBC AUTO: 91.7 FL (ref 80–99.9)
MONOCYTES ABSOLUTE: 0.55 K/UL (ref 0.1–0.95)
MONOCYTES RELATIVE PERCENT: 9 % (ref 2–12)
NEUTROPHILS ABSOLUTE: 3.7 K/UL (ref 1.8–7.3)
NEUTROPHILS RELATIVE PERCENT: 57 % (ref 43–80)
PDW BLD-RTO: 13.5 % (ref 11.5–15)
PLATELET # BLD: 259 K/UL (ref 130–450)
PMV BLD AUTO: 12.1 FL (ref 7–12)
POTASSIUM SERPL-SCNC: 4.5 MMOL/L (ref 3.5–5.1)
RBC # BLD: 4.6 M/UL (ref 3.5–5.5)
SODIUM BLD-SCNC: 142 MMOL/L (ref 136–145)
TOTAL PROTEIN: 7.2 G/DL (ref 6.4–8.3)
TRIGLYCERIDE, FASTING: 226 MG/DL
VLDLC SERPL CALC-MCNC: 45 MG/DL
WBC # BLD: 6.5 K/UL (ref 4.5–11.5)

## 2025-08-10 ENCOUNTER — RESULTS FOLLOW-UP (OUTPATIENT)
Age: 60
End: 2025-08-10

## 2025-08-10 DIAGNOSIS — J98.4 LUNG DENSITY ON X-RAY: ICD-10-CM

## 2025-08-10 DIAGNOSIS — E78.2 MIXED HYPERLIPIDEMIA: Primary | ICD-10-CM

## 2025-08-10 DIAGNOSIS — R93.89 ABNORMAL CHEST X-RAY: ICD-10-CM

## 2025-08-11 RX ORDER — ROSUVASTATIN CALCIUM 5 MG/1
5 TABLET, COATED ORAL DAILY
Qty: 90 TABLET | Refills: 1 | Status: SHIPPED | OUTPATIENT
Start: 2025-08-11

## 2025-08-19 ENCOUNTER — HOSPITAL ENCOUNTER (OUTPATIENT)
Dept: MAMMOGRAPHY | Age: 60
Discharge: HOME | End: 2025-08-21
Attending: FAMILY MEDICINE
Payer: COMMERCIAL

## 2025-08-19 DIAGNOSIS — Z12.31 ENCOUNTER FOR SCREENING MAMMOGRAM FOR MALIGNANT NEOPLASM OF BREAST: ICD-10-CM

## 2025-08-19 PROCEDURE — 77063 BREAST TOMOSYNTHESIS BI: CPT

## 2025-08-25 ENCOUNTER — HOSPITAL ENCOUNTER (OUTPATIENT)
Dept: CT IMAGING | Age: 60
Discharge: HOME | End: 2025-08-27
Attending: FAMILY MEDICINE
Payer: COMMERCIAL

## 2025-08-25 DIAGNOSIS — J98.4 LUNG DENSITY ON X-RAY: ICD-10-CM

## 2025-08-25 DIAGNOSIS — R93.89 ABNORMAL CHEST X-RAY: ICD-10-CM

## 2025-08-25 PROCEDURE — 71250 CT THORAX DX C-: CPT

## (undated) DEVICE — TOWEL,OR,DSP,ST,BLUE,STD,6/PK,12PK/CS: Brand: MEDLINE

## (undated) DEVICE — DISSECTOR LAP DIA5MM BLNT TIP ENDOPATH

## (undated) DEVICE — KIT,ANTI FOG,W/SPONGE & FLUID,SOFT PACK: Brand: MEDLINE

## (undated) DEVICE — GLOVE ORANGE PI 7   MSG9070

## (undated) DEVICE — GRADUATE TRIANG MEASURE 1000ML BLK PRNT

## (undated) DEVICE — GARMENT,MEDLINE,DVT,INT,CALF,MED, GEN2: Brand: MEDLINE

## (undated) DEVICE — TROCAR: Brand: KII FIOS FIRST ENTRY

## (undated) DEVICE — DRAPE,REIN 53X77,STERILE: Brand: MEDLINE

## (undated) DEVICE — APPLIER CLP M L L11.4IN DIA10MM ENDOSCP ROT MULT FOR LIG

## (undated) DEVICE — ADHESIVE SKIN CLOSURE 0.7CC TOP MICROBIAL APPL DERMBND ADV

## (undated) DEVICE — LAPAROSCOPIC SCISSORS: Brand: EPIX LAPAROSCOPIC SCISSORS

## (undated) DEVICE — WARMER SCP 2 ANTIFOG LAP DISP

## (undated) DEVICE — GLOVE ORANGE PI 7 1/2   MSG9075

## (undated) DEVICE — ANCHOR TISSUE RETRIEVAL SYSTEM, BAG SIZE 175 ML, PORT SIZE 10 MM: Brand: ANCHOR TISSUE RETRIEVAL SYSTEM

## (undated) DEVICE — INSUFFLATION NEEDLE TO ESTABLISH PNEUMOPERITONEUM.: Brand: INSUFFLATION NEEDLE

## (undated) DEVICE — GAUZE,SPONGE,4"X4",4PLY,STRL,LF: Brand: MEDLINE

## (undated) DEVICE — TROCAR: Brand: KII® SLEEVE

## (undated) DEVICE — WARMER SCP LAP

## (undated) DEVICE — GENERAL LAPAROSCOPY: Brand: MEDLINE INDUSTRIES, INC.

## (undated) DEVICE — TISSUE RETRIEVAL SYSTEM: Brand: INZII RETRIEVAL SYSTEM

## (undated) DEVICE — GLOVE SURG SZ 8 L12IN FNGR THK79MIL GRN LTX FREE